# Patient Record
Sex: FEMALE | Race: WHITE | Employment: OTHER | URBAN - METROPOLITAN AREA
[De-identification: names, ages, dates, MRNs, and addresses within clinical notes are randomized per-mention and may not be internally consistent; named-entity substitution may affect disease eponyms.]

---

## 2011-03-08 LAB — COLONOSCOPY, EXTERNAL: NORMAL

## 2018-03-08 ENCOUNTER — OFFICE VISIT (OUTPATIENT)
Dept: FAMILY MEDICINE CLINIC | Age: 63
End: 2018-03-08

## 2018-03-08 VITALS
SYSTOLIC BLOOD PRESSURE: 127 MMHG | BODY MASS INDEX: 30.92 KG/M2 | HEIGHT: 66 IN | DIASTOLIC BLOOD PRESSURE: 77 MMHG | RESPIRATION RATE: 18 BRPM | TEMPERATURE: 97.8 F | HEART RATE: 51 BPM | WEIGHT: 192.4 LBS

## 2018-03-08 DIAGNOSIS — E03.9 ACQUIRED HYPOTHYROIDISM: Primary | ICD-10-CM

## 2018-03-08 DIAGNOSIS — I10 ESSENTIAL HYPERTENSION: ICD-10-CM

## 2018-03-08 DIAGNOSIS — K21.9 GASTROESOPHAGEAL REFLUX DISEASE, ESOPHAGITIS PRESENCE NOT SPECIFIED: ICD-10-CM

## 2018-03-08 DIAGNOSIS — E78.00 HYPERCHOLESTEREMIA: ICD-10-CM

## 2018-03-08 DIAGNOSIS — Z12.11 ENCOUNTER FOR FIT (FECAL IMMUNOCHEMICAL TEST) SCREENING: ICD-10-CM

## 2018-03-08 PROBLEM — J30.89 ALLERGIC RHINITIS DUE TO DUST MITE: Status: ACTIVE | Noted: 2018-03-08

## 2018-03-08 RX ORDER — ALBUTEROL SULFATE 90 UG/1
AEROSOL, METERED RESPIRATORY (INHALATION)
COMMUNITY
End: 2020-01-31 | Stop reason: SDUPTHER

## 2018-03-08 RX ORDER — HYDROCHLOROTHIAZIDE 12.5 MG/1
CAPSULE ORAL
Refills: 0 | COMMUNITY
Start: 2018-02-02 | End: 2018-05-17 | Stop reason: SDUPTHER

## 2018-03-08 RX ORDER — LEVOTHYROXINE SODIUM 88 UG/1
TABLET ORAL
Refills: 5 | COMMUNITY
Start: 2018-02-17 | End: 2018-03-21 | Stop reason: SDUPTHER

## 2018-03-08 RX ORDER — DICLOFENAC SODIUM 10 MG/G
GEL TOPICAL 4 TIMES DAILY
COMMUNITY
End: 2019-02-19

## 2018-03-08 RX ORDER — PRAVASTATIN SODIUM 20 MG/1
TABLET ORAL
Refills: 5 | COMMUNITY
Start: 2018-01-14 | End: 2018-03-08 | Stop reason: SDUPTHER

## 2018-03-08 RX ORDER — PRAVASTATIN SODIUM 20 MG/1
TABLET ORAL
Qty: 90 TAB | Refills: 2 | Status: SHIPPED | OUTPATIENT
Start: 2018-03-08 | End: 2018-11-11 | Stop reason: SDUPTHER

## 2018-03-08 RX ORDER — RAMIPRIL 2.5 MG/1
CAPSULE ORAL
Refills: 0 | COMMUNITY
Start: 2018-02-12 | End: 2018-05-17 | Stop reason: SDUPTHER

## 2018-03-08 NOTE — MR AVS SNAPSHOT
[address] 
[phone] Patient: Edenilson Levine MRN: [id number] :1955 Visit Information Date & Time Provider Department Dept. Phone Encounter #  
 3/8/2018 10:00 AM Dru Breen 34 [id number] Upcoming Health Maintenance Date Due Hepatitis C Screening 1955 PAP AKA CERVICAL CYTOLOGY 1976 FOBT Q 1 YEAR AGE 50-75 2005 BREAST CANCER SCRN MAMMOGRAM 5/15/2019 DTaP/Tdap/Td series (2 - Td) 5/15/2023 Allergies as of 3/8/2018  Review Complete On: 3/8/2018 By: Agatha Cunningham MD  
  
 Severity Noted Reaction Type Reactions Sulfa (Sulfonamide Antibiotics)  2018    Seizures Current Immunizations  Never Reviewed No immunizations on file. Not reviewed this visit You Were Diagnosed With   
  
 Codes Comments Acquired hypothyroidism    -  Primary ICD-10-CM: E03.9 ICD-9-CM: 244.9 Encounter for FIT (fecal immunochemical test) screening     ICD-10-CM: Z12.11 ICD-9-CM: V76.51 Gastroesophageal reflux disease, esophagitis presence not specified     ICD-10-CM: K21.9 ICD-9-CM: 530.81 Essential hypertension     ICD-10-CM: I10 
ICD-9-CM: 401.9 Hypercholesteremia     ICD-10-CM: E78.00 ICD-9-CM: 272.0 Vitals BP Pulse Temp Resp Height(growth percentile) Weight(growth percentile) 127/77 (!) 51 97.8 °F (36.6 °C) (Oral) 18 5' 6\" (1.676 m) 192 lb 6.4 oz (87.3 kg) BMI OB Status Smoking Status 31.05 kg/m2 Postmenopausal Former Smoker Vitals History BMI and BSA Data Body Mass Index Body Surface Area 31.05 kg/m 2 2.02 m 2 Preferred Pharmacy Pharmacy Name Phone [id number] [address] [facility] [phone] Your Updated Medication List  
  
   
 This list is accurate as of 3/8/18 11:12 AM.  Always use your most recent med list.  
  
  
  
  
 diclofenac 1 % Gel Commonly known as:  VOLTAREN Apply  to affected area four (4) times daily. hydroCHLOROthiazide 12.5 mg capsule Commonly known as:  Abilio Jewel TAKE ONE CAPSULE BY MOUTH EVERY DAY  
  
 levothyroxine 88 mcg tablet Commonly known as:  SYNTHROID  
1 TABLET ON AN EMPTY STOMACH IN THE MORNING EVERY OTHER DAY ALTERNATING WITH 100 MCG ORALLY 30 DAYS  
  
 pravastatin 20 mg tablet Commonly known as:  PRAVACHOL  
TAKE 1 TABLET ONCE A DAY ORALLY PROAIR HFA 90 mcg/actuation inhaler Generic drug:  albuterol Take  by inhalation. ramipril 2.5 mg capsule Commonly known as:  ALTACE  
TAKE 1 CAPSULE BY MOUTH ONCE A DAY FOR 90 DAYS Prescriptions Sent to Pharmacy Refills  
 pravastatin (PRAVACHOL) 20 mg tablet 2 Sig: TAKE 1 TABLET ONCE A DAY ORALLY Class: Normal  
 Pharmacy: [address] [place] [address] #: [phone] We Performed the Following OCCULT BLOOD, IMMUNOASSAY (FIT) [id number] CPT(R)] TSH 3RD GENERATION [01944 CPT(R)] Introducing [facility]! [employer] introduces [employer] patient portal. Now you can access parts of your medical record, email your doctor's office, and request medication refills online. 1. In your internet browser, go to [email or web address] 2. Click on the First Time User? Click Here link in the Sign In box. You will see the New Member Sign Up page. 3. Enter your [employer] Access Code exactly as it appears below. You will not need to use this code after youve completed the sign-up process. If you do not sign up before the expiration date, you must request a new code. · [employer] Access Code: [email or web address] Expires: 6/6/2018 11:12 AM 
 
4.  Enter the last four digits of your Social Security Number (xxxx) and Date of Birth (mm/dd/yyyy) as indicated and click Submit. You will be taken to the next sign-up page. 5. Create a SchoolTube ID. This will be your SchoolTube login ID and cannot be changed, so think of one that is secure and easy to remember. 6. Create a SchoolTube password. You can change your password at any time. 7. Enter your Password Reset Question and Answer. This can be used at a later time if you forget your password. 8. Enter your e-mail address. You will receive e-mail notification when new information is available in [address] 9. Click Sign Up. You can now view and download portions of your medical record. 10. Click the Download Summary menu link to download a portable copy of your medical information. If you have questions, please visit the Frequently Asked Questions section of the SchoolTube website. Remember, SchoolTube is NOT to be used for urgent needs. For medical emergencies, dial 911. Now available from your iPhone and Android! Please provide this summary of care documentation to your next provider. Your primary care clinician is listed as Isadora Disla. If you have any questions after today's visit, please call [phone].

## 2018-03-08 NOTE — PROGRESS NOTES
Chief Complaint   Patient presents with   [facility] Establish Care     1. Have you been to the ER, urgent care clinic since your last visit? No Hospitalized since your last visit? No     2. Have you seen or consulted any other health care providers outside of the [address] [place] [address] since your last visit? Include any pap smears or colon screening.  [facility]

## 2018-03-08 NOTE — PROGRESS NOTES
HISTORY OF PRESENT ILLNESS  Conner Curiel is a 58 y.o. female. HPI Comments: Conner Curiel is here to establish care. She brings in her medical records, which were reviewed, and all of her labs are up to date except for her TSH. Her dose was changed in December, and she needs a follow up one. Establish Care   The history is provided by the patient. Pertinent negatives include no chest pain, no abdominal pain, no headaches and no shortness of breath. Hypertension   The history is provided by the patient. This is a chronic problem. The current episode started more than 1 week ago. The problem occurs constantly. The problem has been gradually improving. Pertinent negatives include no chest pain, no abdominal pain, no headaches and no shortness of breath. Nothing aggravates the symptoms. The symptoms are relieved by medications. Treatments tried: ramipril, HCTZ. The treatment provided significant relief. Review of Systems   Constitutional: Positive for weight loss. No weight gain   Eyes: Negative for blurred vision. Respiratory: Negative for shortness of breath. Cardiovascular: Negative for chest pain and leg swelling. Gastrointestinal: Positive for heartburn. Negative for abdominal pain. She has GERD symptoms once a week   Neurological: Negative for dizziness, sensory change, speech change, focal weakness and headaches. Visit Vitals    /77    Pulse (!) 51    Temp 97.8 °F (36.6 °C) (Oral)    Resp 18    Ht 5' 6\" (1.676 m)    Wt 192 lb 6.4 oz (87.3 kg)    BMI 31.05 kg/m2     Physical Exam   Constitutional: She is oriented to person, place, and time. She appears well-developed and well-nourished. No distress. Neck: No thyromegaly present. Cardiovascular: Normal rate, regular rhythm and normal heart sounds. Exam reveals no gallop and no friction rub. No murmur heard. Pulmonary/Chest: Effort normal and breath sounds normal. No respiratory distress.  She has no wheezes. She has no rales. Musculoskeletal: She exhibits no edema. Neurological: She is alert and oriented to person, place, and time. Skin: Skin is warm and dry. She is not diaphoretic. Nursing note and vitals reviewed. ASSESSMENT and PLAN    ICD-10-CM ICD-9-CM    1. Acquired hypothyroidism E03.9 244.9 TSH 3RD GENERATION   2. Essential hypertension I10 401.9    3. Gastroesophageal reflux disease, esophagitis presence not specified K21.9 530.81    4. Hypercholesteremia E78.00 272.0 pravastatin (PRAVACHOL) 20 mg tablet   5. Encounter for FIT (fecal immunochemical test) screening Z12.11 V76.51 OCCULT BLOOD, IMMUNOASSAY (FIT)        Needs follow up TSH  Blood pressure controlled  Controlled gastroesophageal reflux disease  Check TSH  Refill pravastatin  Kit for FOBT testing given to patient    Follow-up Disposition:  Return in about 6 months (around 9/8/2018) for blood pressure. Reviewed plan of care. Patient has provided input and agrees with goals.

## 2018-03-09 LAB — TSH SERPL DL<=0.005 MIU/L-ACNC: 0.27 UIU/ML (ref 0.45–4.5)

## 2018-03-19 ENCOUNTER — TELEPHONE (OUTPATIENT)
Dept: FAMILY MEDICINE CLINIC | Age: 63
End: 2018-03-19

## 2018-03-19 DIAGNOSIS — E03.9 ACQUIRED HYPOTHYROIDISM: Primary | ICD-10-CM

## 2018-03-19 NOTE — TELEPHONE ENCOUNTER
Please call patient and let her know her thyroid is a little overactive. Please confirm her dose. I am decreasing her thyroid medication and will send a new prescription to her pharmacy. She will need a TSH in 6 weeks, and I have printed a lab slip.

## 2018-03-19 NOTE — TELEPHONE ENCOUNTER
Patient informed & she is taking Levothyroxine 88 mcg daily. I did not see new order sent to [facility] pharmacy to decrease the thyroid medication dosage.

## 2018-03-21 RX ORDER — LEVOTHYROXINE SODIUM 75 UG/1
TABLET ORAL
Qty: 30 TAB | Refills: 1 | Status: SHIPPED | OUTPATIENT
Start: 2018-03-21 | End: 2018-05-10 | Stop reason: SDUPTHER

## 2018-03-21 NOTE — TELEPHONE ENCOUNTER
Patient informed, new order for thyroid medication was sent to her pharmacy and lab slip was mailed to her home address.

## 2018-04-02 LAB — HEMOCCULT STL QL IA: NEGATIVE

## 2018-04-30 DIAGNOSIS — E03.9 ACQUIRED HYPOTHYROIDISM: ICD-10-CM

## 2018-05-02 LAB — TSH SERPL DL<=0.005 MIU/L-ACNC: 4.65 UIU/ML (ref 0.45–4.5)

## 2018-05-10 NOTE — TELEPHONE ENCOUNTER
From: Raphael Daniel  To: Julieth Romano MD  Sent: 5/10/2018 10:06 AM EDT  Subject: Medication Renewal Request    Original authorizing provider: Julieth Romano MD    Raphael Daniel would like a refill of the following medications:  levothyroxine (SYNTHROID) 75 mcg tablet Julieth Romano MD]    [id number] [address] [facility] [place]    Comment:  I also need to have my Ramipril refilled. This would be the first time Dr. Velvet Amaya has filled this prescription for me since I switched over to her office. Please check my charts. It should be listed on them as a medication that I am taking. In addition I just had bloodwork done for the levothyroxine and Dr. Velvet Amaya might need to check that before refilling this prescription. Finally, just so that you know I have 2 weeks left on both of these prescriptions and then I am all out of medication.

## 2018-05-13 RX ORDER — LEVOTHYROXINE SODIUM 75 UG/1
TABLET ORAL
Qty: 30 TAB | Refills: 1 | Status: SHIPPED | OUTPATIENT
Start: 2018-05-13 | End: 2018-05-17 | Stop reason: DRUGHIGH

## 2018-05-17 ENCOUNTER — DOCUMENTATION ONLY (OUTPATIENT)
Dept: FAMILY MEDICINE CLINIC | Age: 63
End: 2018-05-17

## 2018-05-17 ENCOUNTER — OFFICE VISIT (OUTPATIENT)
Dept: FAMILY MEDICINE CLINIC | Age: 63
End: 2018-05-17

## 2018-05-17 VITALS
BODY MASS INDEX: 31.02 KG/M2 | HEART RATE: 60 BPM | SYSTOLIC BLOOD PRESSURE: 119 MMHG | DIASTOLIC BLOOD PRESSURE: 75 MMHG | TEMPERATURE: 98.8 F | HEIGHT: 66 IN | WEIGHT: 193 LBS | RESPIRATION RATE: 18 BRPM

## 2018-05-17 DIAGNOSIS — I10 ESSENTIAL HYPERTENSION: ICD-10-CM

## 2018-05-17 DIAGNOSIS — E03.9 ACQUIRED HYPOTHYROIDISM: Primary | ICD-10-CM

## 2018-05-17 RX ORDER — RAMIPRIL 2.5 MG/1
CAPSULE ORAL
Qty: 90 CAP | Refills: 3 | Status: SHIPPED | OUTPATIENT
Start: 2018-05-17 | End: 2018-05-20 | Stop reason: SDUPTHER

## 2018-05-17 RX ORDER — HYDROCHLOROTHIAZIDE 12.5 MG/1
CAPSULE ORAL
Qty: 90 CAP | Refills: 3 | Status: SHIPPED | OUTPATIENT
Start: 2018-05-17 | End: 2018-05-20 | Stop reason: SDUPTHER

## 2018-05-17 RX ORDER — HYDROCHLOROTHIAZIDE 12.5 MG/1
CAPSULE ORAL
Qty: 90 CAP | Refills: 0 | Status: CANCELLED | OUTPATIENT
Start: 2018-05-17

## 2018-05-17 RX ORDER — RAMIPRIL 2.5 MG/1
CAPSULE ORAL
Qty: 90 CAP | Refills: 0 | Status: CANCELLED | OUTPATIENT
Start: 2018-05-17

## 2018-05-17 RX ORDER — LEVOTHYROXINE SODIUM 75 UG/1
TABLET ORAL
Qty: 15 TAB | Refills: 1 | Status: SHIPPED | OUTPATIENT
Start: 2018-05-17 | End: 2019-07-13 | Stop reason: SDUPTHER

## 2018-05-17 RX ORDER — LEVOTHYROXINE SODIUM 88 UG/1
TABLET ORAL
Qty: 15 TAB | Refills: 1 | Status: SHIPPED | OUTPATIENT
Start: 2018-05-17 | End: 2018-09-08 | Stop reason: SDUPTHER

## 2018-05-17 NOTE — PROGRESS NOTES
Chief Complaint   Patient presents with    Medication Refill     f/u abnormal labs-thyroid     1. Have you been to the ER, urgent care clinic since your last visit? No  Hospitalized since your last visit? No     2. Have you seen or consulted any other health care providers outside of the [address] [place] [address] since your last visit? Include any pap smears or colon screening.  No

## 2018-05-17 NOTE — MR AVS SNAPSHOT
[address] 
[phone] Patient: Warden Gabriel MRN: [id number] :1955 Visit Information Date & Time Provider Department Dept. Phone Encounter #  
 2018  2:30 PM Dru Page 34 [id number] Follow-up Instructions Return in about 3 months (around 2018) for blood pressure. Your Appointments 2018  9:45 AM  
ROUTINE CARE with Henrry Salas MD  
[address] [facility]) Appt Note: 6 month follow up HTN, thyroid [address] [facility] [address]  
[phone]  
  
   
 [address] [place] [address] Upcoming Health Maintenance Date Due Hepatitis C Screening 1955 PAP AKA CERVICAL CYTOLOGY 1976 Influenza Age 5 to Adult 2018 BREAST CANCER SCRN MAMMOGRAM 5/15/2019 COLONOSCOPY 3/8/2021 DTaP/Tdap/Td series (2 - Td) 5/15/2023 Allergies as of 2018  Review Complete On: 2018 By: Henrry Salas MD  
  
 Severity Noted Reaction Type Reactions Sulfa (Sulfonamide Antibiotics)  2018    Seizures Current Immunizations  Never Reviewed No immunizations on file. Not reviewed this visit You Were Diagnosed With   
  
 Codes Comments Acquired hypothyroidism    -  Primary ICD-10-CM: E03.9 ICD-9-CM: 244.9 Essential hypertension     ICD-10-CM: I10 
ICD-9-CM: 401.9 Vitals BP Pulse Temp Resp Height(growth percentile) Weight(growth percentile) 119/75 60 98.8 °F (37.1 °C) (Oral) 18 5' 6\" (1.676 m) 193 lb (87.5 kg) BMI OB Status Smoking Status 31.15 kg/m2 Postmenopausal Former Smoker Vitals History BMI and BSA Data Body Mass Index Body Surface Area  
 31.15 kg/m 2 2.02 m 2 Preferred Pharmacy Pharmacy Name Phone [id number]- [address] AT [facility] [phone] Your Updated Medication List  
  
   
This list is accurate as of 18  4:00 PM.  Always use your most recent med list.  
  
  
  
  
 diclofenac 1 % Gel Commonly known as:  VOLTAREN Apply  to affected area four (4) times daily. hydroCHLOROthiazide 12.5 mg capsule Commonly known as:  Jaramillo Dyers TAKE ONE CAPSULE BY MOUTH EVERY DAY  
  
 * levothyroxine 88 mcg tablet Commonly known as:  SYNTHROID  
1 TABLET PO, ON AN EMPTY STOMACH, IN THE MORNING EVERY OTHER DAY ALTERNATING WITH 75 MCG  
  
 * levothyroxine 75 mcg tablet Commonly known as:  SYNTHROID  
1 TABLET PO, ON AN EMPTY STOMACH, IN THE MORNING EVERY OTHER DAY ALTERNATING WITH 88 MCG  
  
 pravastatin 20 mg tablet Commonly known as:  PRAVACHOL  
TAKE 1 TABLET ONCE A DAY ORALLY PROAIR HFA 90 mcg/actuation inhaler Generic drug:  albuterol Take  by inhalation. ramipril 2.5 mg capsule Commonly known as:  ALTACE  
TAKE 1 CAPSULE BY MOUTH ONCE A DAY FOR 90 DAYS * Notice: This list has 2 medication(s) that are the same as other medications prescribed for you. Read the directions carefully, and ask your doctor or other care provider to review them with you. Prescriptions Sent to Pharmacy Refills  
 ramipril (ALTACE) 2.5 mg capsule 3 Sig: TAKE 1 CAPSULE BY MOUTH ONCE A DAY FOR 90 DAYS Class: Normal  
 Pharmacy: [address] [place] [address] Ph #: [phone]  
 hydroCHLOROthiazide (MICROZIDE) 12.5 mg capsule 3 Sig: TAKE ONE CAPSULE BY MOUTH EVERY DAY Class: Normal  
 Pharmacy: [address] [place] [address] Ph #: [phone]  
 levothyroxine (SYNTHROID) 88 mcg tablet 1 Si TABLET PO, ON AN EMPTY STOMACH, IN THE MORNING EVERY OTHER DAY ALTERNATING WITH 75 MCG  Class: Normal  
 Pharmacy: [id number], [address] [place] [address] Ph #: [phone]  
 levothyroxine (SYNTHROID) 75 mcg tablet 1 Si TABLET PO, ON AN EMPTY STOMACH, IN THE MORNING EVERY OTHER DAY ALTERNATING WITH 88 MCG Class: Normal  
 Pharmacy: [facility], [address] [facility] Ph #: [phone] Follow-up Instructions Return in about 3 months (around 2018) for blood pressure. To-Do List   
 Around 2018 Lab:  METABOLIC PANEL, BASIC Around 2018 Lab:  TSH 3RD GENERATION Introducing [facility]! Dear Chepe Whitley: 
Thank you for requesting a [employer] account. Our records indicate that you already have an active [employer] account. You can access your account anytime at [email or web address] Did you know that you can access your hospital and ER discharge instructions at any time in [employer]? You can also review all of your test results from your hospital stay or ER visit. Additional Information If you have questions, please visit the Frequently Asked Questions section of the [employer] website at [email or web address] Remember, [employer] is NOT to be used for urgent needs. For medical emergencies, dial 911. Now available from your iPhone and Android! Please provide this summary of care documentation to your next provider. Your primary care clinician is listed as Felisa Munroe. If you have any questions after today's visit, please call [phone].

## 2018-05-17 NOTE — PROGRESS NOTES
Pt called to advise she received Dr. Gustavo Ken email regarding abnormal thyroid labs and scheduled follow up appointment today. Pt also requesting status of refills and will discuss at her appointment.   Alonzo

## 2018-05-17 NOTE — TELEPHONE ENCOUNTER
Pt has appointment today and needs refills on medications sent to [facility] pharmacy.  Alonzo

## 2018-05-17 NOTE — PROGRESS NOTES
HISTORY OF PRESENT ILLNESS  Raphael Daniel is a 58 y.o. female. HPI Comments: Raphael Daniel is here to follow up on her thyroid labs. Her TSH was mildly depressed, so her Synthroid was decreased to the next lowest strength. When her TSH was checked this month, however, her TSH was mildly elevated. She says she has been going through this for several years. The patient takes her medication daily and wakes up in the middle of the night to take her medication so it doesn't interact with anything. Also, she needs her blood pressure medications refilled. Review of Systems   Constitutional: Negative for malaise/fatigue and weight loss. No weight gain   HENT:        No voice changes   Gastrointestinal: Negative for constipation and diarrhea. Skin:        Her hair loss has gotten better. No skin changes. Neurological: Negative for tremors and weakness. Psychiatric/Behavioral: Negative for depression. Visit Vitals    /75    Pulse 60    Temp 98.8 °F (37.1 °C) (Oral)    Resp 18    Ht 5' 6\" (1.676 m)    Wt 193 lb (87.5 kg)    BMI 31.15 kg/m2     Physical Exam   Constitutional: She is oriented to person, place, and time. She appears well-developed and well-nourished. No distress. Cardiovascular: Normal rate, regular rhythm and normal heart sounds. Exam reveals no gallop and no friction rub. No murmur heard. Pulmonary/Chest: Effort normal and breath sounds normal. No respiratory distress. She has no wheezes. She has no rales. Musculoskeletal: She exhibits no edema. Neurological: She is alert and oriented to person, place, and time. She has normal reflexes. Skin: Skin is warm and dry. She is not diaphoretic. Good turgor, no hair loss   Psychiatric: She has a normal mood and affect. Nursing note and vitals reviewed. ASSESSMENT and PLAN    ICD-10-CM ICD-9-CM    1.  Acquired hypothyroidism E03.9 244.9 levothyroxine (SYNTHROID) 88 mcg tablet      levothyroxine (SYNTHROID) 75 mcg tablet      METABOLIC PANEL, BASIC      TSH 3RD GENERATION   2. Essential hypertension I10 401.9 ramipril (ALTACE) 2.5 mg capsule      hydroCHLOROthiazide (MICROZIDE) 12.5 mg capsule        Difficult to control hypothyroidism  Blood pressure control  Increase Synthroid, alternating doses  Labs per orders in 6 weeks  Refills per orders    Follow-up Disposition:  Return in about 3 months (around 8/17/2018) for blood pressure. Reviewed plan of care. Patient has provided input and agrees with goals.

## 2018-05-20 DIAGNOSIS — I10 ESSENTIAL HYPERTENSION: ICD-10-CM

## 2018-05-20 RX ORDER — HYDROCHLOROTHIAZIDE 12.5 MG/1
CAPSULE ORAL
Qty: 90 CAP | Refills: 3 | Status: SHIPPED | OUTPATIENT
Start: 2018-05-20 | End: 2019-02-26 | Stop reason: SDUPTHER

## 2018-05-20 RX ORDER — RAMIPRIL 2.5 MG/1
CAPSULE ORAL
Qty: 90 CAP | Refills: 3 | Status: SHIPPED | OUTPATIENT
Start: 2018-05-20 | End: 2019-02-26 | Stop reason: SDUPTHER

## 2018-06-30 LAB
BUN SERPL-MCNC: 12 MG/DL (ref 8–27)
BUN/CREAT SERPL: 12 (ref 12–28)
CALCIUM SERPL-MCNC: 9.6 MG/DL (ref 8.7–10.3)
CHLORIDE SERPL-SCNC: 100 MMOL/L (ref 96–106)
CO2 SERPL-SCNC: 28 MMOL/L (ref 20–29)
CREAT SERPL-MCNC: 1.04 MG/DL (ref 0.57–1)
GLUCOSE SERPL-MCNC: 114 MG/DL (ref 65–99)
INTERPRETATION: NORMAL
POTASSIUM SERPL-SCNC: 3.9 MMOL/L (ref 3.5–5.2)
SODIUM SERPL-SCNC: 141 MMOL/L (ref 134–144)
TSH SERPL DL<=0.005 MIU/L-ACNC: 3 UIU/ML (ref 0.45–4.5)

## 2018-07-02 DIAGNOSIS — E03.9 ACQUIRED HYPOTHYROIDISM: ICD-10-CM

## 2018-07-10 LAB — MAMMOGRAPHY, EXTERNAL: NORMAL

## 2018-09-03 RX ORDER — LEVOTHYROXINE SODIUM 75 UG/1
TABLET ORAL
Qty: 30 TAB | Refills: 9 | Status: SHIPPED | OUTPATIENT
Start: 2018-09-03 | End: 2019-11-07 | Stop reason: SDUPTHER

## 2018-09-07 ENCOUNTER — OFFICE VISIT (OUTPATIENT)
Dept: FAMILY MEDICINE CLINIC | Age: 63
End: 2018-09-07

## 2018-09-07 VITALS
RESPIRATION RATE: 18 BRPM | BODY MASS INDEX: 31.34 KG/M2 | TEMPERATURE: 97.5 F | HEIGHT: 66 IN | WEIGHT: 195 LBS | SYSTOLIC BLOOD PRESSURE: 138 MMHG | DIASTOLIC BLOOD PRESSURE: 83 MMHG | HEART RATE: 61 BPM

## 2018-09-07 DIAGNOSIS — I10 ESSENTIAL HYPERTENSION: Primary | ICD-10-CM

## 2018-09-07 DIAGNOSIS — Z11.59 NEED FOR HEPATITIS C SCREENING TEST: ICD-10-CM

## 2018-09-07 DIAGNOSIS — R94.4 DECREASED GFR: ICD-10-CM

## 2018-09-07 NOTE — PROGRESS NOTES
HISTORY OF PRESENT ILLNESS  Melanie Mcdaniel is a 58 y.o. female. HPI Comments: Melanie Mcdaniel is here to follow up on her HTN. Also, she is concerned that her GFR was slightly low on her last labs. She is avoiding NSAIDs. Also, she wants hep C testing. Hypertension   The history is provided by the patient. This is a chronic problem. The current episode started more than 1 week ago. The problem occurs constantly. The problem has been gradually worsening. Pertinent negatives include no chest pain, no abdominal pain, no headaches and no shortness of breath. Nothing aggravates the symptoms. The symptoms are relieved by medications. Treatments tried: Altace, HCTZ. The treatment provided significant relief. Review of Systems   Constitutional: Negative for weight loss. No weight gain   Eyes: Negative for blurred vision. Respiratory: Negative for shortness of breath. Cardiovascular: Negative for chest pain and leg swelling. Gastrointestinal: Negative for abdominal pain. Neurological: Negative for dizziness, sensory change, speech change, focal weakness and headaches. Visit Vitals    /83    Pulse 61    Temp 97.5 °F (36.4 °C) (Oral)    Resp 18    Ht 5' 6\" (1.676 m)    Wt 195 lb (88.5 kg)    BMI 31.47 kg/m2     BP Readings from Last 3 Encounters:   09/07/18 138/83   05/17/18 119/75   03/08/18 127/77     Physical Exam   Constitutional: She is oriented to person, place, and time. She appears well-developed and well-nourished. No distress. Cardiovascular: Normal rate, regular rhythm and normal heart sounds. Exam reveals no gallop and no friction rub. No murmur heard. Pulmonary/Chest: Effort normal and breath sounds normal. No respiratory distress. She has no wheezes. She has no rales. Musculoskeletal: She exhibits no edema. Neurological: She is alert and oriented to person, place, and time. Skin: Skin is warm and dry. She is not diaphoretic.    Nursing note and vitals reviewed. ASSESSMENT and PLAN    ICD-10-CM ICD-9-CM    1. Essential hypertension U45 186.7 METABOLIC PANEL, BASIC   2. Decreased GFR C09.5 807.7 METABOLIC PANEL, BASIC   3. Need for hepatitis C screening test Z11.59 V73.89 HCV AB W/RFLX TO [place]        Blood pressure controlled  Needs follow up on her GFR, off NSAIDs  Labs per orders. Continue current plans. Follow-up Disposition:  Return in about 6 months (around 3/7/2019) for blood pressure. Reviewed plan of care. Patient has provided input and agrees with goals.

## 2018-09-07 NOTE — MR AVS SNAPSHOT
[address] 
[phone] Patient: Concepción Puri MRN: [id number] :1955 Visit Information Date & Time Provider Department Dept. Phone Encounter #  
 2018  9:45 AM Dru Crow 34 [id number] Follow-up Instructions Return in about 6 months (around 3/7/2019) for blood pressure. Upcoming Health Maintenance Date Due Hepatitis C Screening 1955 Influenza Age 5 to Adult 2018 BREAST CANCER SCRN MAMMOGRAM 7/10/2020 COLONOSCOPY 3/8/2021 PAP AKA CERVICAL CYTOLOGY 2021 DTaP/Tdap/Td series (2 - Td) 5/15/2023 Allergies as of 2018  Review Complete On: 2018 By: Shanti Miner MD  
  
 Severity Noted Reaction Type Reactions Sulfa (Sulfonamide Antibiotics)  2018    Seizures Current Immunizations  Never Reviewed No immunizations on file. Not reviewed this visit You Were Diagnosed With   
  
 Codes Comments Essential hypertension    -  Primary ICD-10-CM: I10 
ICD-9-CM: 401.9 Decreased GFR     ICD-10-CM: R94.4 ICD-9-CM: 794.4 Need for hepatitis C screening test     ICD-10-CM: Z11.59 
ICD-9-CM: V73.89 Vitals BP Pulse Temp Resp Height(growth percentile) Weight(growth percentile) 138/83 61 97.5 °F (36.4 °C) (Oral) 18 5' 6\" (1.676 m) 195 lb (88.5 kg) BMI OB Status Smoking Status 31.47 kg/m2 Postmenopausal Former Smoker Vitals History BMI and BSA Data Body Mass Index Body Surface Area  
 31.47 kg/m 2 2.03 m 2 Preferred Pharmacy Pharmacy Name Phone [id number] [place], [address] [facility] [phone] Your Updated Medication List  
  
   
This list is accurate as of 18 10:42 AM.  Always use your most recent med list.  
  
  
  
  
 diclofenac 1 % Gel Commonly known as:  VOLTAREN  
 Apply  to affected area four (4) times daily. hydroCHLOROthiazide 12.5 mg capsule Commonly known as:  Santiago Sine TAKE ONE CAPSULE BY MOUTH EVERY DAY  
  
 * levothyroxine 88 mcg tablet Commonly known as:  SYNTHROID  
1 TABLET PO, ON AN EMPTY STOMACH, IN THE MORNING EVERY OTHER DAY ALTERNATING WITH 75 MCG  
  
 * levothyroxine 75 mcg tablet Commonly known as:  SYNTHROID  
1 TABLET PO, ON AN EMPTY STOMACH, IN THE MORNING EVERY OTHER DAY ALTERNATING WITH 88 MCG  
  
 * levothyroxine 75 mcg tablet Commonly known as:  SYNTHROID  
TAKE 1 TABLET BY MOUTH EVERY DAY  
  
 pravastatin 20 mg tablet Commonly known as:  PRAVACHOL  
TAKE 1 TABLET ONCE A DAY ORALLY PROAIR HFA 90 mcg/actuation inhaler Generic drug:  albuterol Take  by inhalation. ramipril 2.5 mg capsule Commonly known as:  ALTACE  
TAKE 1 CAPSULE BY MOUTH ONCE A DAY FOR 90 DAYS * Notice: This list has 3 medication(s) that are the same as other medications prescribed for you. Read the directions carefully, and ask your doctor or other care provider to review them with you. We Performed the Following HCV AB W/RFLX TO FLORENTIN [96196 CPT(R)] METABOLIC PANEL, BASIC [26046 CPT(R)] Follow-up Instructions Return in about 6 months (around 3/7/2019) for blood pressure. Introducing hospitals & HEALTH SERVICES! Dear Mary Memos: 
Thank you for requesting a [employer] account. Our records indicate that you already have an active [employer] account. You can access your account anytime at [email or web address] Did you know that you can access your hospital and ER discharge instructions at any time in [employer]? You can also review all of your test results from your hospital stay or ER visit. Additional Information If you have questions, please visit the Frequently Asked Questions section of the [employer] website at [email or web address] Remember, MyChart is NOT to be used for urgent needs. For medical emergencies, dial 911. Now available from your iPhone and Android! Please provide this summary of care documentation to your next provider. Your primary care clinician is listed as Basil Phoenix. If you have any questions after today's visit, please call [phone].

## 2018-09-07 NOTE — PROGRESS NOTES
Chief Complaint   Patient presents with    Hypertension     and thyroid 6 mo f/u; discuss labs     1. Have you been to the ER, urgent care clinic since your last visit? No Hospitalized since your last visit? No    2. Have you seen or consulted any other health care providers outside of the [address] [place] [address] since your last visit? Include any pap smears or colon screening.  No     Pt wants to discuss if need for Hep C test.

## 2018-09-08 DIAGNOSIS — E03.9 ACQUIRED HYPOTHYROIDISM: ICD-10-CM

## 2018-09-08 LAB
BUN SERPL-MCNC: 11 MG/DL (ref 8–27)
BUN/CREAT SERPL: 14 (ref 12–28)
CALCIUM SERPL-MCNC: 10 MG/DL (ref 8.7–10.3)
CHLORIDE SERPL-SCNC: 102 MMOL/L (ref 96–106)
CO2 SERPL-SCNC: 28 MMOL/L (ref 20–29)
CREAT SERPL-MCNC: 0.77 MG/DL (ref 0.57–1)
GLUCOSE SERPL-MCNC: 96 MG/DL (ref 65–99)
HCV AB S/CO SERPL IA: <0.1 S/CO RATIO (ref 0–0.9)
HCV AB SERPL QL IA: NORMAL
POTASSIUM SERPL-SCNC: 4.7 MMOL/L (ref 3.5–5.2)
SODIUM SERPL-SCNC: 145 MMOL/L (ref 134–144)

## 2018-09-08 RX ORDER — LEVOTHYROXINE SODIUM 88 UG/1
TABLET ORAL
Qty: 15 TAB | Refills: 9 | Status: SHIPPED | OUTPATIENT
Start: 2018-09-08 | End: 2019-07-07 | Stop reason: SDUPTHER

## 2018-11-11 DIAGNOSIS — E78.00 HYPERCHOLESTEREMIA: ICD-10-CM

## 2018-11-11 DIAGNOSIS — I10 ESSENTIAL HYPERTENSION: ICD-10-CM

## 2018-11-11 DIAGNOSIS — E03.9 ACQUIRED HYPOTHYROIDISM: Primary | ICD-10-CM

## 2018-11-11 RX ORDER — PRAVASTATIN SODIUM 20 MG/1
TABLET ORAL
Qty: 90 TAB | Refills: 0 | Status: SHIPPED | OUTPATIENT
Start: 2018-11-11 | End: 2019-02-21 | Stop reason: SDUPTHER

## 2018-11-12 NOTE — TELEPHONE ENCOUNTER
Called and spoke with pt, and she has been advised and states understanding of needing to have labs drawn. Lab slips have been mailed.

## 2018-11-30 LAB
ALBUMIN SERPL-MCNC: 4.6 G/DL (ref 3.6–4.8)
ALP SERPL-CCNC: 66 IU/L (ref 39–117)
ALT SERPL-CCNC: 20 IU/L (ref 0–32)
AST SERPL-CCNC: 20 IU/L (ref 0–40)
BILIRUB DIRECT SERPL-MCNC: 0.19 MG/DL (ref 0–0.4)
BILIRUB SERPL-MCNC: 0.8 MG/DL (ref 0–1.2)
BUN SERPL-MCNC: 11 MG/DL (ref 8–27)
BUN/CREAT SERPL: 12 (ref 12–28)
CALCIUM SERPL-MCNC: 9.5 MG/DL (ref 8.7–10.3)
CHLORIDE SERPL-SCNC: 101 MMOL/L (ref 96–106)
CHOLEST SERPL-MCNC: 170 MG/DL (ref 100–199)
CO2 SERPL-SCNC: 28 MMOL/L (ref 20–29)
CREAT SERPL-MCNC: 0.92 MG/DL (ref 0.57–1)
GLUCOSE SERPL-MCNC: 97 MG/DL (ref 65–99)
HDLC SERPL-MCNC: 63 MG/DL
INTERPRETATION, 910389: NORMAL
LDLC SERPL CALC-MCNC: 89 MG/DL (ref 0–99)
POTASSIUM SERPL-SCNC: 3.6 MMOL/L (ref 3.5–5.2)
PROT SERPL-MCNC: 6.9 G/DL (ref 6–8.5)
SODIUM SERPL-SCNC: 144 MMOL/L (ref 134–144)
TRIGL SERPL-MCNC: 91 MG/DL (ref 0–149)
TSH SERPL DL<=0.005 MIU/L-ACNC: 4.28 UIU/ML (ref 0.45–4.5)
VLDLC SERPL CALC-MCNC: 18 MG/DL (ref 5–40)

## 2019-02-19 ENCOUNTER — OFFICE VISIT (OUTPATIENT)
Dept: FAMILY MEDICINE CLINIC | Age: 64
End: 2019-02-19

## 2019-02-19 VITALS
WEIGHT: 206 LBS | HEIGHT: 66 IN | DIASTOLIC BLOOD PRESSURE: 83 MMHG | TEMPERATURE: 98 F | HEART RATE: 56 BPM | BODY MASS INDEX: 33.11 KG/M2 | SYSTOLIC BLOOD PRESSURE: 134 MMHG | RESPIRATION RATE: 18 BRPM

## 2019-02-19 DIAGNOSIS — R31.9 HEMATURIA, UNSPECIFIED TYPE: ICD-10-CM

## 2019-02-19 DIAGNOSIS — J01.00 ACUTE NON-RECURRENT MAXILLARY SINUSITIS: ICD-10-CM

## 2019-02-19 DIAGNOSIS — I10 ESSENTIAL HYPERTENSION: Primary | ICD-10-CM

## 2019-02-19 LAB
BILIRUB UR QL STRIP: NEGATIVE
GLUCOSE UR-MCNC: NEGATIVE MG/DL
KETONES P FAST UR STRIP-MCNC: NEGATIVE MG/DL
PH UR STRIP: 6.5 [PH] (ref 4.6–8)
PROT UR QL STRIP: NEGATIVE
SP GR UR STRIP: 1.01 (ref 1–1.03)
UA UROBILINOGEN AMB POC: NORMAL (ref 0.2–1)
URINALYSIS CLARITY POC: CLEAR
URINALYSIS COLOR POC: YELLOW
URINE BLOOD POC: NORMAL
URINE LEUKOCYTES POC: NEGATIVE
URINE NITRITES POC: NEGATIVE

## 2019-02-19 RX ORDER — AMOXICILLIN AND CLAVULANATE POTASSIUM 875; 125 MG/1; MG/1
1 TABLET, FILM COATED ORAL 2 TIMES DAILY
Qty: 20 TAB | Refills: 0 | Status: SHIPPED | OUTPATIENT
Start: 2019-02-19 | End: 2019-03-01

## 2019-02-19 NOTE — PROGRESS NOTES
Chief Complaint   Patient presents with    Hypertension     4 mo f/u    Headache     sinus pain      1. Have you been to the ER, urgent care clinic since your last visit? Hospitalized since your last visit? Yes Urgent Care 12/2018 dx sinus infection     2. Have you seen or consulted any other health care providers outside of the [facility] since your last visit? Include any pap smears or colon screening.  No

## 2019-02-19 NOTE — PROGRESS NOTES
HISTORY OF PRESENT ILLNESS  Sarai Lowery is a 61 y.o. female. Sarai Lowery is here for follow up on her HTN. Also, she has had a sinus infection for the past 2-3 weeks. Symptoms include clear to green nasal discharge, nose bleeding, sinus and tooth pain. Also, she request that she have her urine checked \"just in case\". Hypertension   The history is provided by the patient. This is a chronic problem. The current episode started more than 1 week ago. The problem occurs constantly. The problem has been gradually improving. Associated symptoms include headaches. Pertinent negatives include no chest pain, no abdominal pain and no shortness of breath. Associated symptoms comments: She is having headaches from her current sinus infection. Nothing aggravates the symptoms. The symptoms are relieved by medications. Treatments tried: Altace, HCTZ. The treatment provided significant relief. Review of Systems   Constitutional: Negative for fever and weight loss. Weight gain   Eyes: Negative for blurred vision. Respiratory: Negative for cough, sputum production, shortness of breath and wheezing. Cardiovascular: Negative for chest pain and leg swelling. Gastrointestinal: Negative for abdominal pain. Neurological: Positive for headaches. Negative for dizziness, sensory change, speech change and focal weakness. Visit Vitals  /83   Pulse (!) 56   Temp 98 °F (36.7 °C) (Oral)   Resp 18   Ht 5' 6\" (1.676 m)   Wt 206 lb (93.4 kg)   BMI 33.25 kg/m²     BP Readings from Last 3 Encounters:   09/07/18 138/83   05/17/18 119/75   03/08/18 127/77     Physical Exam   Constitutional: She is oriented to person, place, and time. She appears well-developed and well-nourished. No distress. HENT:   Nose: Right sinus exhibits maxillary sinus tenderness. Right sinus exhibits no frontal sinus tenderness. Left sinus exhibits maxillary sinus tenderness. Left sinus exhibits no frontal sinus tenderness. Cardiovascular: Normal rate, regular rhythm and normal heart sounds. Exam reveals no gallop and no friction rub. No murmur heard. Pulmonary/Chest: Effort normal and breath sounds normal. No respiratory distress. She has no wheezes. She has no rales. Musculoskeletal: She exhibits no edema. Lymphadenopathy:     She has no cervical adenopathy. Neurological: She is alert and oriented to person, place, and time. Skin: Skin is warm and dry. She is not diaphoretic. Nursing note and vitals reviewed. ASSESSMENT and PLAN    ICD-10-CM ICD-9-CM    1. Essential hypertension I10 401.9    2. Acute non-recurrent maxillary sinusitis J01.00 461.0 amoxicillin-clavulanate (AUGMENTIN) 875-125 mg per tablet      chlorpheniramine-dm (CORICIDIN HBP COUGH AND COLD) 4-30 mg tab   3. Hematuria, unspecified type R31.9 599.70 URINALYSIS W/MICROSCOPIC      AMB POC URINALYSIS DIP STICK AUTO W/O MICRO        Blood pressure controlled  Hematuria on dip  Labs per orders. Rest, push fluids  Augmentin    Follow-up Disposition:  Return in about 6 months (around 8/19/2019) for blood pressure, if sinus symptoms not better in 10 days. Reviewed plan of care. Patient has provided input and agrees with goals.

## 2019-02-20 LAB — SPECIMEN STATUS REPORT, ROLRST: NORMAL

## 2019-02-21 DIAGNOSIS — E78.00 HYPERCHOLESTEREMIA: ICD-10-CM

## 2019-02-21 DIAGNOSIS — I10 ESSENTIAL HYPERTENSION: ICD-10-CM

## 2019-02-24 DIAGNOSIS — E78.00 HYPERCHOLESTEREMIA: ICD-10-CM

## 2019-02-25 NOTE — TELEPHONE ENCOUNTER
Regarding: Prescription Question  Contact: [phone]  ----- Message from [address] [place] [address] [address], Generic sent at 2/24/2019 12:25 PM EST -----    [facility]rav[facility]. They claim they made the request. Because of this I have also made a refill request thru the [employer] [facility]. As of today I have 5 days worth of medicine left.      Thanks for taking care of this,  Yoni Dunn

## 2019-02-26 RX ORDER — PRAVASTATIN SODIUM 20 MG/1
TABLET ORAL
Qty: 90 TAB | Refills: 2 | OUTPATIENT
Start: 2019-02-26

## 2019-02-26 RX ORDER — RAMIPRIL 2.5 MG/1
CAPSULE ORAL
Qty: 90 CAP | Refills: 1 | Status: SHIPPED | OUTPATIENT
Start: 2019-02-26 | End: 2019-11-19 | Stop reason: SDUPTHER

## 2019-02-26 RX ORDER — HYDROCHLOROTHIAZIDE 12.5 MG/1
CAPSULE ORAL
Qty: 90 CAP | Refills: 1 | Status: SHIPPED | OUTPATIENT
Start: 2019-02-26 | End: 2019-09-05 | Stop reason: DRUGHIGH

## 2019-02-26 RX ORDER — PRAVASTATIN SODIUM 20 MG/1
TABLET ORAL
Qty: 90 TAB | Refills: 2 | Status: SHIPPED | OUTPATIENT
Start: 2019-02-26 | End: 2019-11-19 | Stop reason: SDUPTHER

## 2019-05-17 ENCOUNTER — OFFICE VISIT (OUTPATIENT)
Dept: FAMILY MEDICINE CLINIC | Age: 64
End: 2019-05-17

## 2019-05-17 VITALS
SYSTOLIC BLOOD PRESSURE: 116 MMHG | TEMPERATURE: 97.6 F | DIASTOLIC BLOOD PRESSURE: 80 MMHG | HEART RATE: 66 BPM | BODY MASS INDEX: 32.95 KG/M2 | HEIGHT: 66 IN | WEIGHT: 205 LBS | RESPIRATION RATE: 18 BRPM

## 2019-05-17 DIAGNOSIS — M25.611 DECREASED RIGHT SHOULDER RANGE OF MOTION: ICD-10-CM

## 2019-05-17 DIAGNOSIS — M75.21 BICEPS TENDINITIS OF RIGHT UPPER EXTREMITY: Primary | ICD-10-CM

## 2019-05-17 RX ORDER — IBUPROFEN 600 MG/1
600 TABLET ORAL
Qty: 40 TAB | Refills: 0 | Status: SHIPPED | OUTPATIENT
Start: 2019-05-17 | End: 2020-09-10

## 2019-05-17 NOTE — PROGRESS NOTES
Chief Complaint   Patient presents with    Arm Pain     rt and rt shoulder x 3 wks; pt states was using hand shovel to plant     1. Have you been to the ER, urgent care clinic since your last visit? Hospitalized since your last visit? No     2. Have you seen or consulted any other health care providers outside of the [address] [place] [address] since your last visit? Include any pap smears or colon screening.  No

## 2019-05-17 NOTE — PROGRESS NOTES
HISTORY OF PRESENT ILLNESS  Liana Habermann is a 61 y.o. female. Liana Habermann presents with right shoulder and arm pain about 2-3 weeks. It started after planting plants in the yard with the hand spade. She has never had this problem before. Elevating the arm past 90 degrees plus internal and external rotation make it worse. The pain is a little better. She has tried Tylenol without relief. Heat helps. Review of Systems   Constitutional: Negative for chills and fever. Musculoskeletal: Negative for neck pain. Neurological: Positive for tingling and sensory change. Negative for focal weakness. Mild numbness to tingling in the arm       Visit Vitals  /80   Pulse 66   Temp 97.6 °F (36.4 °C) (Oral)   Resp 18   Ht 5' 6\" (1.676 m)   Wt 205 lb (93 kg)   BMI 33.09 kg/m²     Physical Exam   Constitutional: She is oriented to person, place, and time. She appears well-developed and well-nourished. No distress. Cardiovascular: Normal rate, regular rhythm and normal heart sounds. Exam reveals no gallop and no friction rub. No murmur heard. Pulmonary/Chest: Effort normal and breath sounds normal.   Musculoskeletal:        Right shoulder: She exhibits decreased range of motion, tenderness and pain. She exhibits no bony tenderness, no deformity and normal strength. Cervical back: She exhibits decreased range of motion. She exhibits no tenderness, no bony tenderness, no swelling, no deformity, no pain and no spasm. Right upper arm: She exhibits no tenderness. Tenderness over the biceps tendon   Neurological: She is alert and oriented to person, place, and time. She has normal strength. No sensory deficit. Reflex Scores:       Tricep reflexes are 2+ on the right side and 2+ on the left side. Bicep reflexes are 2+ on the right side and 2+ on the left side. Brachioradialis reflexes are 2+ on the right side and 2+ on the left side. Skin: Skin is warm and dry.  She is not diaphoretic. ASSESSMENT and PLAN    ICD-10-CM ICD-9-CM    1. Biceps tendinitis of right upper extremity M75.21 726.12 ibuprofen (MOTRIN) 600 mg tablet      REFERRAL TO PHYSICAL THERAPY   2. Decreased right shoulder range of motion M25.611 719.51 REFERRAL TO PHYSICAL THERAPY        Needs PT to get her range of motion back  Limited amount of Motrin prn as she had issues with her GFR this past summer  Heat alternating with ice  PT referral    Follow-up and Dispositions    · Return if symptoms worsen or fail to improve. Reviewed plan of care. Patient has provided input and agrees with goals.

## 2019-06-05 ENCOUNTER — HOSPITAL ENCOUNTER (OUTPATIENT)
Dept: PHYSICAL THERAPY | Age: 64
Discharge: HOME | End: 2019-06-05
Payer: COMMERCIAL

## 2019-06-05 ENCOUNTER — HOSPITAL ENCOUNTER (OUTPATIENT)
Dept: CT IMAGING | Age: 64
Discharge: HOME | End: 2019-06-05
Payer: SELF-PAY

## 2019-06-05 DIAGNOSIS — Z29.9 PREVENTIVE MEASURE: ICD-10-CM

## 2019-06-05 PROCEDURE — 97110 THERAPEUTIC EXERCISES: CPT

## 2019-06-05 PROCEDURE — 97110 THERAPEUTIC EXERCISES: CPT | Performed by: PHYSICAL THERAPIST

## 2019-06-05 PROCEDURE — 97014 ELECTRIC STIMULATION THERAPY: CPT

## 2019-06-05 PROCEDURE — 97014 ELECTRIC STIMULATION THERAPY: CPT | Performed by: PHYSICAL THERAPIST

## 2019-06-05 PROCEDURE — 75571 CT HRT W/O DYE W/CA TEST: CPT

## 2019-06-05 PROCEDURE — 97161 PT EVAL LOW COMPLEX 20 MIN: CPT

## 2019-06-05 PROCEDURE — 97161 PT EVAL LOW COMPLEX 20 MIN: CPT | Performed by: PHYSICAL THERAPIST

## 2019-06-05 NOTE — PROGRESS NOTES
PT INITIAL EVALUATION NOTE - [facility] 2-15    Patient Name: Miladis Person  Date:2019  : 1955  [x]  Patient  Verified  Payor: BLUE [employer]EN / Plan: Peter Lozano [id number] PPO / Product Type: PPO /    In time:1:30  Out time:2:30  Total Treatment Time (min): 60  Total Timed Codes (min): 30  1:1 Treatment Time ( only): 60   Visit #: 1     Treatment Area: Bicipital tendinitis, right shoulder [M75.21]  Right shoulder pain [M25.511]    SUBJECTIVE  Pain Level (0-10 scale): 2 at rest   Any medication changes, allergies to medications, adverse drug reactions, diagnosis change, or new procedure performed?: [] No    [x] Yes (see summary sheet for update)  Subjective:    Pt complains of R shoulder that goes down to arm with numbness   PLOF: bowling, gardening, quilting with no pain or limitation  Mechanism of Injury: pain onset after planting flowers 4 weeks ago  Previous Treatment/Compliance: no previous treatment   PMHx/Surgical Hx: none  Work Hx: retired  Living Situation:   Pt Goals:  to return to gardening activities with no shoulder pain or limitation  Barriers: none  Motivation: motivated  Substance use: none  FABQ Score:   Cognition: A & O x 4        OBJECTIVE/EXAMINATION  Posture:   Pt sits with rounded shoulders  Palpation: TTP distal to acromion and proximal biceps   Joint Mobility: Glenohumeral joint mobility WNL       Shoulder AROM, PROM:         R   L  Shoulder Flexion  120 P! WNL        Shoulder Abduction  120P! WNL  Shoulder Extension  25% limited pain! WNL  Shoulder ER   WNL   WNL  Shoulder IR   25% limited pain!  WNL            MMT:   Shoulder flexion   4/5 P!   5/5  Shoulder ER   4/5   5/5  Shoulder IR   5/5   5/5      Special Tests:   Painful Arc:  Positive   Nielsen:  Positive   Neer's:  negative   Empty/Full Can Test: positive empty   Drop Arm: Negative    ER Lag: negative   Lift Off test: Negative     Crank: negative   Biceps Load I: positive         Modality rationale: decrease inflammation, decrease pain and increase tissue extensibility to improve the patients ability to raise arm overhead to brush hair   Min Type Additional Details   15 [x] Estim: []Att   [x]Unatt        []TENS instruct                  [x]IFC  []Premod   []NMES                     []Other:  []w/US   []w/ice   [x]w/heat  Position: seated  Location: R upper trapezius    []  Traction: [] Cervical       []Lumbar                       [] Prone          []Supine                       []Intermittent   []Continuous Lbs:  [] before manual  [] after manual  []w/heat    []  Ultrasound: []Continuous   [] Pulsed at:                           []1MHz   []3MHz Location:  W/cm2:    [] Paraffin         Location:   []w/heat    []  Ice     []  Heat  []  Ice massage Position:  Location:    []  Laser  []  Other: Position:  Location:      []  Vasopneumatic Device Pressure:       [] lo [] med [] hi   Temperature:      [x] Skin assessment post-treatment:  [x]intact []redness- no adverse reaction    []redness  adverse reaction:     30 min Therapeutic Exercise:  [x] See flow sheet :   Rationale: increase ROM and increase strength to improve the patients ability to raise her arm overhead without pain            With   [] TE   [] TA   [] neuro   [] other: Patient Education: [x] Review HEP    [] Progressed/Changed HEP based on:   [] positioning   [] body mechanics   [] transfers   [] heat/ice application    [] other:      Other Objective/Functional Measures:     Pain Level (0-10 scale) post treatment: 2    ASSESSMENT/Changes in Function:     [x]  See Plan of Lesia Arzate 61 6/5/2019

## 2019-06-05 NOTE — PROGRESS NOTES
[address] [facility] Radha Weston  Phone: [phone]  Fax: [phone]    Plan of Care/ Statement of Necessity for Physical Therapy Services 2-15    Patient name: Kamila Cleary  : 1955  Provider#: [id number]  Referral source: Ankita Markham MD      Medical/Treatment Diagnosis: Bicipital tendinitis, right shoulder [M75.21]  Right shoulder pain [M25.511]     Prior Hospitalization: see medical history     Comorbidities: none  Prior Level of Function: gardening and bowling with no pain or limitation  Medications: Verified on Patient Summary List    Start of Care: 2019     Onset Date: 2019       The Plan of Care and following information is based on the information from the initial evaluation. Assessment/ key information: Pt complains of R anterolateral shoulder pain that began when digging soil for plants one month ago. Today pt presents with shoulder pain, decreased range of motion, weakness, tenderness to palpation, and inability to raise arm. These impairments are limiting the patient's ability to get dressed, perform yard work, and go bowling. Pt would benefit from skilled PT to address these functional limitations.    Evaluation Complexity History LOW Complexity : Zero comorbidities / personal factors that will impact the outcome / POC; Examination LOW Complexity : 1-2 Standardized tests and measures addressing body structure, function, activity limitation and / or participation in recreation  ;Presentation LOW Complexity : Stable, uncomplicated  ;Clinical Decision Making LOW Complexity : FOTO score of   Overall Complexity Rating: LOW     Problem List: pain affecting function, decrease ROM, decrease strength, decrease ADL/ functional abilitiies, decrease activity tolerance and decrease flexibility/ joint mobility   Treatment Plan may include any combination of the following: Therapeutic exercise, Therapeutic activities, Neuromuscular re-education, Physical agent/modality, Manual therapy, Self Care training and Functional mobility training  Patient / Family readiness to learn indicated by: asking questions, trying to perform skills and interest  Persons(s) to be included in education: patient (P)  Barriers to Learning/Limitations: None  Patient Goal (s): I want to return to gardening with no pain in shoulder  Patient Self Reported Health Status: good  Rehabilitation Potential: excellent    Short Term Goals: To be accomplished in 8 treatments:  Pt will be able to reach overhead with 0/10 pain in order to improve ability to brush hair  Pt will be able to reach behind back with 0/10 pain to improve ability to put on belt  Pt will be able to lift 5lb weight to shoulder level to improve ability to perform yard work  Long Term Goals: To be accomplished in 16 treatments:  Pt will report being able to garden for 1 hour with 0/10 pain in shoulder  Pt will be able to lift 10lbs to shoulder level to improve ability to put a gallon of milk on the shelf  Pt will report being able to lift a 20lb object from the ground to waist level to improve ability to carry groceries   Frequency / Duration: Patient to be seen 2 times per week for 8 weeks. Patient/ Caregiver education and instruction: self care, activity modification and exercises    [x]  Plan of care has been reviewed with SHAYY Brewer 6/5/2019     ________________________________________________________________________    I certify that the above Therapy Services are being furnished while the patient is under my care. I agree with the treatment plan and certify that this therapy is necessary.     [de-identified] Signature:____________________  Date:____________Time: _________

## 2019-06-06 NOTE — CARDIO/PULMONARY
Cardiopulmonary Rehab: I reached this patient by phone and shared her coronary calcium CT score of \" 93\" with her. Education given regarding coronary artery disease and its effects on the cardiovascular system were reviewed. Patient states that she does not have a family history of coronary artery disease, that she does not have diabetes, and  states she is not a nicotine user. Patient reports she is currently requiring cholesterol medication and blood pressure medication. Patient reports being of overweight and on a weight loss plan, reports making heart healthy diet choices on most days and is regularly physically active. Patient does not feel that stress is a risk factor for her. Patient to follow up with primary care physician. Understanding verbalized and no further questions at this time.

## 2019-06-13 ENCOUNTER — HOSPITAL ENCOUNTER (OUTPATIENT)
Dept: PHYSICAL THERAPY | Age: 64
Discharge: HOME | End: 2019-06-13
Payer: COMMERCIAL

## 2019-06-13 PROCEDURE — 97014 ELECTRIC STIMULATION THERAPY: CPT

## 2019-06-13 PROCEDURE — 97110 THERAPEUTIC EXERCISES: CPT

## 2019-06-13 NOTE — PROGRESS NOTES
PT DAILY TREATMENT NOTE - [facility] 2-15    Patient Name: Ruth Hancock  Date:2019  : 1955  [x]  Patient  Verified  Payor: Ahmet Wesley / Plan: Peter Lozano [id number] PPO / Product Type: PPO /    In time:12:00  Out time:1:00p  Total Treatment Time (min): 60  Total Timed Codes (min): 45  1:1 Treatment Time (MC only): 30   Visit #:  2    Treatment Area: Bicipital tendinitis, right shoulder [M75.21]  Right shoulder pain [M25.511]    SUBJECTIVE  Pain Level (0-10 scale): 2  Any medication changes, allergies to medications, adverse drug reactions, diagnosis change, or new procedure performed?: [x] No    [] Yes (see summary sheet for update)  Subjective functional status/changes:   [] No changes reported  Patient reports compliance with HEP.  Patient reports her neck has also been more stiff and sore recently    OBJECTIVE    Modality rationale: decrease pain and increase tissue extensibility to improve the patients ability to lift, carry, reach and complete ADL's   Min Type Additional Details      15 [x] Estim: []Att   [x]Unatt    []TENS instruct                  [x]IFC  []Premod   []NMES                     []Other:  []w/US   []w/ice   [x]w/heat  Position: reclined  Location:neck       []  Traction: [] Cervical       []Lumbar                       [] Prone          []Supine                       []Intermittent   []Continuous Lbs:  [] before manual  [] after manual  []w/heat    []  Ultrasound: []Continuous   [] Pulsed                       at: []1MHz   []3MHz Location:  W/cm2:    [] Paraffin         Location:   []w/heat    []  Ice     []  Heat  []  Ice massage Position:  Location:    []  Laser  []  Other: Position:  Location:      []  Vasopneumatic Device Pressure:       [] lo [] med [] hi   Temperature:      [x] Skin assessment post-treatment:  [x]intact []redness- no adverse reaction    []redness  adverse reaction:     35 min Therapeutic Exercise:  [] See flow sheet :   Rationale: increase ROM, increase strength, improve coordination, improve balance and increase proprioception to improve the patients ability to lift, carry, reach and complete ADL's    10 min Manual Therapy: MFR, UT, Levator, scapular gliding    Rationale: decrease pain, increase ROM, increase tissue extensibility and decrease trigger points to improve the patients ability to lift, carry, reach and complete ADL's    With   [] TE   [] TA   [] neuro   [] other: Patient Education: [x] Review HEP    [] Progressed/Changed HEP based on:   [] positioning   [] body mechanics   [] transfers   [] heat/ice application    [] other:      Other Objective/Functional Measures:      Pain Level (0-10 scale) post treatment: 2    ASSESSMENT/Changes in Function:     Patient will continue to benefit from skilled PT services to modify and progress therapeutic interventions, address functional mobility deficits, address ROM deficits, address strength deficits, analyze and address soft tissue restrictions, analyze and cue movement patterns, analyze and modify body mechanics/ergonomics and assess and modify postural abnormalities to attain remaining goals. []  See Plan of Care  []  See progress note/recertification  []  See Discharge Summary         Progress towards goals / Updated goals:  Patient able to advance exercises with no increased pain and demonstrates improved ROM this visit.      PLAN  [x]  Upgrade activities as tolerated     [x]  Continue plan of care  []  Update interventions per flow sheet       []  Discharge due to:_  []  Other:_      Jordan Short 6/13/2019

## 2019-06-18 ENCOUNTER — HOSPITAL ENCOUNTER (OUTPATIENT)
Dept: PHYSICAL THERAPY | Age: 64
Discharge: HOME | End: 2019-06-18
Payer: COMMERCIAL

## 2019-06-18 PROCEDURE — 97014 ELECTRIC STIMULATION THERAPY: CPT

## 2019-06-18 PROCEDURE — 97140 MANUAL THERAPY 1/> REGIONS: CPT

## 2019-06-18 PROCEDURE — 97110 THERAPEUTIC EXERCISES: CPT

## 2019-06-18 NOTE — PROGRESS NOTES
PT DAILY TREATMENT NOTE - [facility] 2-15    Patient Name: Larry Pendleton  Date:2019  : 1955  [x]  Patient  Verified  Payor: BLUE CROSS / Plan: [place] [facility] PPO / Product Type: PPO /    In time:11:00  Out time:12:00p  Total Treatment Time (min): 60  Total Timed Codes (min): 45  1:1 Treatment Time ( only): 45   Visit #:  3    Treatment Area: Bicipital tendinitis, right shoulder [M75.21]  Right shoulder pain [M25.511]    SUBJECTIVE  Pain Level (0-10 scale): 2  Any medication changes, allergies to medications, adverse drug reactions, diagnosis change, or new procedure performed?: [x] No    [] Yes (see summary sheet for update)  Subjective functional status/changes:   [] No changes reported  Patient reports she did too much cleaning on Saturday and is hurting more today. Patient states she felt better after last session.     OBJECTIVE    Modality rationale: decrease pain and increase tissue extensibility to improve the patients ability to lift, carry, reach and complete ADL's   Min Type Additional Details      15 [x] Estim: []Att   [x]Unatt    []TENS instruct                  [x]IFC  []Premod   []NMES                     []Other:  []w/US   []w/ice   [x]w/heat  Position: reclined  Location:neck       []  Traction: [] Cervical       []Lumbar                       [] Prone          []Supine                       []Intermittent   []Continuous Lbs:  [] before manual  [] after manual  []w/heat    []  Ultrasound: []Continuous   [] Pulsed                       at: []1MHz   []3MHz Location:  W/cm2:    [] Paraffin         Location:   []w/heat    []  Ice     []  Heat  []  Ice massage Position:  Location:    []  Laser  []  Other: Position:  Location:      []  Vasopneumatic Device Pressure:       [] lo [] med [] hi   Temperature:      [x] Skin assessment post-treatment:  [x]intact []redness- no adverse reaction    []redness  adverse reaction:     35 min Therapeutic Exercise:  [] See flow sheet : Rationale: increase ROM, increase strength, improve coordination, improve balance and increase proprioception to improve the patients ability to lift, carry, reach and complete ADL's    10 min Manual Therapy: MFR, UT, Levator, scapular gliding, medial scapular border, shoulder stabilizers    Rationale: decrease pain, increase ROM, increase tissue extensibility and decrease trigger points to improve the patients ability to lift, carry, reach and complete ADL's    With   [] TE   [] TA   [] neuro   [] other: Patient Education: [x] Review HEP    [] Progressed/Changed HEP based on:   [] positioning   [] body mechanics   [] transfers   [] heat/ice application    [] other:      Other Objective/Functional Measures:      Pain Level (0-10 scale) post treatment: 0.5    ASSESSMENT/Changes in Function:     Patient will continue to benefit from skilled PT services to modify and progress therapeutic interventions, address functional mobility deficits, address ROM deficits, address strength deficits, analyze and address soft tissue restrictions, analyze and cue movement patterns, analyze and modify body mechanics/ergonomics and assess and modify postural abnormalities to attain remaining goals. []  See Plan of Care  []  See progress note/recertification  []  See Discharge Summary         Progress towards goals / Updated goals:  Patient able to advance exercises with no increased pain and demonstrates improved ROM this visit.      PLAN  [x]  Upgrade activities as tolerated     [x]  Continue plan of care  []  Update interventions per flow sheet       []  Discharge due to:_  []  Other:_      Render Lone 6/18/2019

## 2019-06-20 ENCOUNTER — HOSPITAL ENCOUNTER (OUTPATIENT)
Dept: PHYSICAL THERAPY | Age: 64
Discharge: HOME | End: 2019-06-20
Payer: COMMERCIAL

## 2019-06-20 PROCEDURE — 97110 THERAPEUTIC EXERCISES: CPT

## 2019-06-20 PROCEDURE — 97140 MANUAL THERAPY 1/> REGIONS: CPT

## 2019-06-20 PROCEDURE — 97014 ELECTRIC STIMULATION THERAPY: CPT

## 2019-06-20 NOTE — PROGRESS NOTES
PT DAILY TREATMENT NOTE - [facility] 2-15    Patient Name: Raghav Alarcon  Date:2019  : 1955  [x]  Patient  Verified  Payor: BLUE CROSS / Plan: [employer] [id number] PPO / Product Type: PPO /    In time:12:00p  Out time:1:00p  Total Treatment Time (min): 60  Total Timed Codes (min): 45  1:1 Treatment Time ( only): 45   Visit #:  4    Treatment Area: Bicipital tendinitis, right shoulder [M75.21]  Right shoulder pain [M25.511]    SUBJECTIVE  Pain Level (0-10 scale): 1  Any medication changes, allergies to medications, adverse drug reactions, diagnosis change, or new procedure performed?: [x] No    [] Yes (see summary sheet for update)  Subjective functional status/changes:   [] No changes reported  Patient states she feels much better today, but does have some pain and tightness in both shoulders L>R today.     OBJECTIVE    Modality rationale: decrease pain and increase tissue extensibility to improve the patients ability to lift, carry, reach and complete ADL's   Min Type Additional Details      15 [x] Estim: []Att   [x]Unatt    []TENS instruct                  [x]IFC  []Premod   []NMES                     []Other:  []w/US   []w/ice   [x]w/heat  Position: reclined  Location:neck       []  Traction: [] Cervical       []Lumbar                       [] Prone          []Supine                       []Intermittent   []Continuous Lbs:  [] before manual  [] after manual  []w/heat    []  Ultrasound: []Continuous   [] Pulsed                       at: []1MHz   []3MHz Location:  W/cm2:    [] Paraffin         Location:   []w/heat    []  Ice     []  Heat  []  Ice massage Position:  Location:    []  Laser  []  Other: Position:  Location:      []  Vasopneumatic Device Pressure:       [] lo [] med [] hi   Temperature:      [x] Skin assessment post-treatment:  [x]intact []redness- no adverse reaction    []redness  adverse reaction:     35 min Therapeutic Exercise:  [x] See flow sheet :   Rationale: increase ROM, increase strength, improve coordination, improve balance and increase proprioception to improve the patients ability to lift, carry, reach and complete ADL's    10 min Manual Therapy: MFR B UT, Levator, scapular gliding, medial scapular border, shoulder stabilizers    Rationale: decrease pain, increase ROM, increase tissue extensibility and decrease trigger points to improve the patients ability to lift, carry, reach and complete ADL's    With   [] TE   [] TA   [] neuro   [] other: Patient Education: [x] Review HEP    [] Progressed/Changed HEP based on:   [] positioning   [] body mechanics   [] transfers   [] heat/ice application    [] other:      Other Objective/Functional Measures:      Pain Level (0-10 scale) post treatment: 0    ASSESSMENT/Changes in Function:     Patient will continue to benefit from skilled PT services to modify and progress therapeutic interventions, address functional mobility deficits, address ROM deficits, address strength deficits, analyze and address soft tissue restrictions, analyze and cue movement patterns, analyze and modify body mechanics/ergonomics and assess and modify postural abnormalities to attain remaining goals. []  See Plan of Care  []  See progress note/recertification  []  See Discharge Summary         Progress towards goals / Updated goals:  Patient making good progress towards goals with improved AROM and exercise/activity tolerance.     PLAN  [x]  Upgrade activities as tolerated     [x]  Continue plan of care  []  Update interventions per flow sheet       []  Discharge due to:_  []  Other:_      Jorge Suarez 6/20/2019

## 2019-06-25 ENCOUNTER — HOSPITAL ENCOUNTER (OUTPATIENT)
Dept: PHYSICAL THERAPY | Age: 64
Discharge: HOME | End: 2019-06-25
Payer: COMMERCIAL

## 2019-06-25 PROCEDURE — 97140 MANUAL THERAPY 1/> REGIONS: CPT

## 2019-06-25 PROCEDURE — 97014 ELECTRIC STIMULATION THERAPY: CPT

## 2019-06-25 PROCEDURE — 97110 THERAPEUTIC EXERCISES: CPT

## 2019-06-25 NOTE — PROGRESS NOTES
PT DAILY TREATMENT NOTE - [facility] 2-15    Patient Name: Cal Gage  Date:2019  : 1955  [x]  Patient  Verified  Payor: BLUE CROSS / Plan: [employer] [id number] PPO / Product Type: PPO /    In time:11:00a  Out time:12:00p  Total Treatment Time (min): 60  Total Timed Codes (min): 45  1:1 Treatment Time ( only): 45   Visit #:  5    Treatment Area: Bicipital tendinitis, right shoulder [M75.21]  Right shoulder pain [M25.511]    SUBJECTIVE  Pain Level (0-10 scale): 1  Any medication changes, allergies to medications, adverse drug reactions, diagnosis change, or new procedure performed?: [x] No    [] Yes (see summary sheet for update)  Subjective functional status/changes:   [] No changes reported  Patient reports she was in more pain this weekend due to working on her quilting machine, but was able to manage and decrease her pain with heat and stretches.     OBJECTIVE    Modality rationale: decrease pain and increase tissue extensibility to improve the patients ability to lift, carry, reach and complete ADL's   Min Type Additional Details      15 [x] Estim: []Att   [x]Unatt    []TENS instruct                  [x]IFC  []Premod   []NMES                     []Other:  []w/US   []w/ice   [x]w/heat  Position: reclined  Location:neck       []  Traction: [] Cervical       []Lumbar                       [] Prone          []Supine                       []Intermittent   []Continuous Lbs:  [] before manual  [] after manual  []w/heat    []  Ultrasound: []Continuous   [] Pulsed                       at: []1MHz   []3MHz Location:  W/cm2:    [] Paraffin         Location:   []w/heat    []  Ice     []  Heat  []  Ice massage Position:  Location:    []  Laser  []  Other: Position:  Location:      []  Vasopneumatic Device Pressure:       [] lo [] med [] hi   Temperature:      [x] Skin assessment post-treatment:  [x]intact []redness- no adverse reaction    []redness  adverse reaction:     35 min Therapeutic Exercise: [x] See flow sheet :   Rationale: increase ROM, increase strength, improve coordination, improve balance and increase proprioception to improve the patients ability to lift, carry, reach and complete ADL's    10 min Manual Therapy: MFR B UT, Levator, shoulder stabilizers    Rationale: decrease pain, increase ROM, increase tissue extensibility and decrease trigger points to improve the patients ability to lift, carry, reach and complete ADL's    With   [] TE   [] TA   [] neuro   [] other: Patient Education: [x] Review HEP    [] Progressed/Changed HEP based on:   [] positioning   [] body mechanics   [] transfers   [] heat/ice application    [] other:      Other Objective/Functional Measures:      Pain Level (0-10 scale) post treatment: 0    ASSESSMENT/Changes in Function:     Patient will continue to benefit from skilled PT services to modify and progress therapeutic interventions, address functional mobility deficits, address ROM deficits, address strength deficits, analyze and address soft tissue restrictions, analyze and cue movement patterns, analyze and modify body mechanics/ergonomics and assess and modify postural abnormalities to attain remaining goals. []  See Plan of Care  []  See progress note/recertification  []  See Discharge Summary         Progress towards goals / Updated goals:  Patient making good progress towards goals and is demonstrating improved ROM and decreased pain with reaching across body. Patient able to advance several exercises this visit.     PLAN  [x]  Upgrade activities as tolerated     [x]  Continue plan of care  []  Update interventions per flow sheet       []  Discharge due to:_  []  Other:_      Mitesh Sanchez 6/25/2019

## 2019-06-27 ENCOUNTER — HOSPITAL ENCOUNTER (OUTPATIENT)
Dept: PHYSICAL THERAPY | Age: 64
Discharge: HOME | End: 2019-06-27
Payer: COMMERCIAL

## 2019-06-27 PROCEDURE — 97014 ELECTRIC STIMULATION THERAPY: CPT

## 2019-06-27 PROCEDURE — 97140 MANUAL THERAPY 1/> REGIONS: CPT

## 2019-06-27 PROCEDURE — 97110 THERAPEUTIC EXERCISES: CPT

## 2019-06-27 NOTE — PROGRESS NOTES
PT DAILY TREATMENT NOTE - [facility] 2-15    Patient Name: Chanel Ch  Date:2019  : 1955  [x]  Patient  Verified  Payor: BLUE [employer] / Plan: [employer] [id number] PPO / Product Type: PPO /    In time:9:50a  Out time:11:00a  Total Treatment Time (min): 70  Total Timed Codes (min): 55  1:1 Treatment Time ( only): 40   Visit #:  6    Treatment Area: Bicipital tendinitis, right shoulder [M75.21]  Right shoulder pain [M25.511]    SUBJECTIVE  Pain Level (0-10 scale): 1  Any medication changes, allergies to medications, adverse drug reactions, diagnosis change, or new procedure performed?: [x] No    [] Yes (see summary sheet for update)  Subjective functional status/changes:   [] No changes reported  Patient reports she has a \"hot spot\" posterior shouder and some irritation theough the anterior shoulder today.     OBJECTIVE    Modality rationale: decrease pain and increase tissue extensibility to improve the patients ability to lift, carry, reach and complete ADL's   Min Type Additional Details      15 [x] Estim: []Att   [x]Unatt    []TENS instruct                  [x]IFC  []Premod   []NMES                     []Other:  []w/US   []w/ice   [x]w/heat  Position: reclined  Location:neck       []  Traction: [] Cervical       []Lumbar                       [] Prone          []Supine                       []Intermittent   []Continuous Lbs:  [] before manual  [] after manual  []w/heat    []  Ultrasound: []Continuous   [] Pulsed                       at: []1MHz   []3MHz Location:  W/cm2:    [] Paraffin         Location:   []w/heat    []  Ice     []  Heat  []  Ice massage Position:  Location:    []  Laser  []  Other: Position:  Location:      []  Vasopneumatic Device Pressure:       [] lo [] med [] hi   Temperature:      [x] Skin assessment post-treatment:  [x]intact []redness- no adverse reaction    []redness  adverse reaction:     45 min Therapeutic Exercise:  [x] See flow sheet :   Rationale: increase ROM, increase strength, improve coordination, improve balance and increase proprioception to improve the patients ability to lift, carry, reach and complete ADL's    10 min Manual Therapy: MFR B UT, Levator, shoulder stabilizers, PROM all directions to tolerance, mobs with movement abduction and flexion    Rationale: decrease pain, increase ROM, increase tissue extensibility and decrease trigger points to improve the patients ability to lift, carry, reach and complete ADL's    With   [] TE   [] TA   [] neuro   [] other: Patient Education: [x] Review HEP    [] Progressed/Changed HEP based on:   [] positioning   [] body mechanics   [] transfers   [] heat/ice application    [] other:      Other Objective/Functional Measures:  Continued pain at end range ROM flexion and abuction    Pain Level (0-10 scale) post treatment: 0    ASSESSMENT/Changes in Function:     Patient will continue to benefit from skilled PT services to modify and progress therapeutic interventions, address functional mobility deficits, address ROM deficits, address strength deficits, analyze and address soft tissue restrictions, analyze and cue movement patterns, analyze and modify body mechanics/ergonomics and assess and modify postural abnormalities to attain remaining goals. []  See Plan of Care  []  See progress note/recertification  []  See Discharge Summary         Progress towards goals / Updated goals:  Patient making good progress towards goals and is demonstrating improved ROM and decreased pain with reaching across body. Patient able to advance several exercises this visit.     PLAN  [x]  Upgrade activities as tolerated     [x]  Continue plan of care  []  Update interventions per flow sheet       []  Discharge due to:_  []  Other:_      Maurice Hills 6/27/2019

## 2019-07-01 ENCOUNTER — HOSPITAL ENCOUNTER (OUTPATIENT)
Dept: PHYSICAL THERAPY | Age: 64
Discharge: HOME | End: 2019-07-01
Payer: COMMERCIAL

## 2019-07-01 PROCEDURE — 97014 ELECTRIC STIMULATION THERAPY: CPT

## 2019-07-01 PROCEDURE — 97110 THERAPEUTIC EXERCISES: CPT

## 2019-07-01 PROCEDURE — 97140 MANUAL THERAPY 1/> REGIONS: CPT

## 2019-07-01 NOTE — PROGRESS NOTES
PT DAILY TREATMENT NOTE - [facility] 2-15    Patient Name: Nicole Wang  Date:2019  : 1955  [x]  Patient  Verified  Payor: BLUE CROSS / Plan: [employer] [id number] PPO / Product Type: PPO /    In time: 10:30a  Out time:11:40a  Total Treatment Time (min): 70  Total Timed Codes (min): 55  1:1 Treatment Time ( only): 30   Visit #:  7    Treatment Area: Bicipital tendinitis, right shoulder [M75.21]  Right shoulder pain [M25.511]    SUBJECTIVE  Pain Level (0-10 scale): 0  Any medication changes, allergies to medications, adverse drug reactions, diagnosis change, or new procedure performed?: [x] No    [] Yes (see summary sheet for update)  Subjective functional status/changes:   [] No changes reported  Patient reports she still has increased difficulty with reaching across body and behind back.     OBJECTIVE    Modality rationale: decrease pain and increase tissue extensibility to improve the patients ability to lift, carry, reach and complete ADL's   Min Type Additional Details      15 [x] Estim: []Att   [x]Unatt    []TENS instruct                  [x]IFC  []Premod   []NMES                     []Other:  []w/US   []w/ice   [x]w/heat  Position: reclined  Location:neck       []  Traction: [] Cervical       []Lumbar                       [] Prone          []Supine                       []Intermittent   []Continuous Lbs:  [] before manual  [] after manual  []w/heat    []  Ultrasound: []Continuous   [] Pulsed                       at: []1MHz   []3MHz Location:  W/cm2:    [] Paraffin         Location:   []w/heat    []  Ice     []  Heat  []  Ice massage Position:  Location:    []  Laser  []  Other: Position:  Location:      []  Vasopneumatic Device Pressure:       [] lo [] med [] hi   Temperature:      [x] Skin assessment post-treatment:  [x]intact []redness- no adverse reaction    []redness - adverse reaction:     45 min Therapeutic Exercise:  [x] See flow sheet :   Rationale: increase ROM, increase strength, improve coordination, improve balance and increase proprioception to improve the patients ability to lift, carry, reach and complete ADL's    10 min Manual Therapy: MFR B UT, Levator, shoulder stabilizers, PROM all directions to tolerance, mobs with movement abduction and flexion    Rationale: decrease pain, increase ROM, increase tissue extensibility and decrease trigger points to improve the patients ability to lift, carry, reach and complete ADL's    With   [] TE   [] TA   [] neuro   [] other: Patient Education: [x] Review HEP    [] Progressed/Changed HEP based on:   [] positioning   [] body mechanics   [] transfers   [] heat/ice application    [] other:      Other Objective/Functional Measures:     AROM L shoulder;  Flexion:145  Abduction: 125  Extension: 48  ER: WNL  FIR: T12    MMT:   Flexion:5/5  ER: 5/5  IR:5/5      Pain Level (0-10 scale) post treatment: 0    ASSESSMENT/Changes in Function:     Patient will continue to benefit from skilled PT services to modify and progress therapeutic interventions, address functional mobility deficits, address ROM deficits, address strength deficits, analyze and address soft tissue restrictions, analyze and cue movement patterns, analyze and modify body mechanics/ergonomics and assess and modify postural abnormalities to attain remaining goals. []  See Plan of Care  []  See progress note/recertification  []  See Discharge Summary         Progress towards goals / Updated goals:  Patient making good progress towards goals and is demonstrating improved ROM and decreased pain with reaching across body. Patient able to advance several exercises this visit. Patient will do well with continued therapy 1x week for 3-4 weeks to reach remaining goals. Short Term Goals:  To be accomplished in 8 treatments:  Pt will be able to reach overhead with 0/10 pain in order to improve ability to brush hair Met  Pt will be able to reach behind back with 0/10 pain to improve ability to put on belt Progressing  Pt will be able to lift 5lb weight to shoulder level to improve ability to perform yard work Met  Long Term Goals: To be accomplished in 16 treatments:  Pt will report being able to garden for 1 hour with 0/10 pain in shoulder  Pt will be able to lift 10lbs to shoulder level to improve ability to put a gallon of milk on the shelf  Pt will report being able to lift a 20lb object from the ground to waist level to improve ability to carry groceries   Frequency / Duration: Patient to be seen 2 times per week for 8 weeks.         PLAN  [x]  Upgrade activities as tolerated     [x]  Continue plan of care  []  Update interventions per flow sheet       []  Discharge due to:_  []  Other:_      Samuel Aquino 7/1/2019

## 2019-07-03 ENCOUNTER — HOSPITAL ENCOUNTER (OUTPATIENT)
Dept: PHYSICAL THERAPY | Age: 64
Discharge: HOME | End: 2019-07-03
Payer: COMMERCIAL

## 2019-07-03 PROCEDURE — 97110 THERAPEUTIC EXERCISES: CPT

## 2019-07-03 PROCEDURE — 97014 ELECTRIC STIMULATION THERAPY: CPT

## 2019-07-03 PROCEDURE — 97140 MANUAL THERAPY 1/> REGIONS: CPT

## 2019-07-03 NOTE — PROGRESS NOTES
PT DAILY TREATMENT NOTE - [facility] 2-15    Patient Name: Blanco Sosa  Date:7/3/2019  : 1955  [x]  Patient  Verified  Payor: BLUE CROSS / Plan: [employer] [id number] PPO / Product Type: PPO /    In time: 10:30a  Out time:11:40a  Total Treatment Time (min): 70  Total Timed Codes (min): 55  1:1 Treatment Time (MC only): 30   Visit #:  8    Treatment Area: Bicipital tendinitis, right shoulder [M75.21]  Right shoulder pain [M25.511]    SUBJECTIVE  Pain Level (0-10 scale): 0.5  Any medication changes, allergies to medications, adverse drug reactions, diagnosis change, or new procedure performed?: [x] No    [] Yes (see summary sheet for update)  Subjective functional status/changes:   [x] No changes reported  Patient states reaching behind back is getting better.     OBJECTIVE    Modality rationale: decrease pain and increase tissue extensibility to improve the patients ability to lift, carry, reach and complete ADL's   Min Type Additional Details      15 [x] Estim: []Att   [x]Unatt    []TENS instruct                  [x]IFC  []Premod   []NMES                     []Other:  []w/US   []w/ice   [x]w/heat  Position: reclined  Location:neck       []  Traction: [] Cervical       []Lumbar                       [] Prone          []Supine                       []Intermittent   []Continuous Lbs:  [] before manual  [] after manual  []w/heat    []  Ultrasound: []Continuous   [] Pulsed                       at: []1MHz   []3MHz Location:  W/cm2:    [] Paraffin         Location:   []w/heat    []  Ice     []  Heat  []  Ice massage Position:  Location:    []  Laser  []  Other: Position:  Location:      []  Vasopneumatic Device Pressure:       [] lo [] med [] hi   Temperature:      [x] Skin assessment post-treatment:  [x]intact []redness- no adverse reaction    []redness - adverse reaction:     45 min Therapeutic Exercise:  [x] See flow sheet :   Rationale: increase ROM, increase strength, improve coordination, improve balance and increase proprioception to improve the patients ability to lift, carry, reach and complete ADL's    10 min Manual Therapy: MFR B UT, Levator, shoulder stabilizers, PROM all directions to tolerance, mobs with movement abduction and flexion    Rationale: decrease pain, increase ROM, increase tissue extensibility and decrease trigger points to improve the patients ability to lift, carry, reach and complete ADL's    With   [] TE   [] TA   [] neuro   [] other: Patient Education: [x] Review HEP    [] Progressed/Changed HEP based on:   [] positioning   [] body mechanics   [] transfers   [] heat/ice application    [] other:      Other Objective/Functional Measures:     AROM L shoulder:  Flexion:145  Abduction: 125  Extension: 48  ER: WNL  FIR: T12    MMT:   Flexion:5/5  ER: 5/5  IR:5/5      Pain Level (0-10 scale) post treatment: 0    ASSESSMENT/Changes in Function:     Patient will continue to benefit from skilled PT services to modify and progress therapeutic interventions, address functional mobility deficits, address ROM deficits, address strength deficits, analyze and address soft tissue restrictions, analyze and cue movement patterns, analyze and modify body mechanics/ergonomics and assess and modify postural abnormalities to attain remaining goals. []  See Plan of Care  []  See progress note/recertification  []  See Discharge Summary         Progress towards goals / Updated goals:  Patient demonstrates good tolerance with decreased fatigue this visit. Patient will do well with continued progression as tolerated next visit. Short Term Goals: To be accomplished in 8 treatments:  Pt will be able to reach overhead with 0/10 pain in order to improve ability to brush hair Met  Pt will be able to reach behind back with 0/10 pain to improve ability to put on belt Progressing  Pt will be able to lift 5lb weight to shoulder level to improve ability to perform yard work Met  Long Term Goals:  To be accomplished in 16 treatments:  Pt will report being able to garden for 1 hour with 0/10 pain in shoulder Met  Pt will be able to lift 10lbs to shoulder level to improve ability to put a gallon of milk on the shelf  Pt will report being able to lift a 20lb object from the ground to waist level to improve ability to carry groceries   Frequency / Duration: Patient to be seen 2 times per week for 8 weeks.         PLAN  [x]  Upgrade activities as tolerated     [x]  Continue plan of care  []  Update interventions per flow sheet       []  Discharge due to:_  []  Other:_      Elvis Chowdhury 7/3/2019

## 2019-07-07 DIAGNOSIS — E03.9 ACQUIRED HYPOTHYROIDISM: ICD-10-CM

## 2019-07-08 ENCOUNTER — HOSPITAL ENCOUNTER (OUTPATIENT)
Dept: PHYSICAL THERAPY | Age: 64
Discharge: HOME | End: 2019-07-08
Payer: COMMERCIAL

## 2019-07-08 PROCEDURE — 97110 THERAPEUTIC EXERCISES: CPT

## 2019-07-08 PROCEDURE — 97140 MANUAL THERAPY 1/> REGIONS: CPT

## 2019-07-08 PROCEDURE — 97014 ELECTRIC STIMULATION THERAPY: CPT

## 2019-07-08 NOTE — PROGRESS NOTES
PT DAILY TREATMENT NOTE - [facility] 2-15    Patient Name: Karis Munguia  Date:2019  : 1955  [x]  Patient  Verified  Payor: BLUE CROSS / Plan: Peter Lozano [id number] PPO / Product Type: PPO /    In time: 10:30a  Out time:11:40a  Total Treatment Time (min): 70  Total Timed Codes (min): 55  1:1 Treatment Time ( only): 30   Visit #:  9    Treatment Area: Bicipital tendinitis, right shoulder [M75.21]  Right shoulder pain [M25.511]    SUBJECTIVE  Pain Level (0-10 scale): 0  Any medication changes, allergies to medications, adverse drug reactions, diagnosis change, or new procedure performed?: [x] No    [] Yes (see summary sheet for update)  Subjective functional status/changes:   [x] No changes reported  Patient reports she feels a little tight from the long weekend, but is doing good and happy with her progress so far.      OBJECTIVE    Modality rationale: decrease pain and increase tissue extensibility to improve the patients ability to lift, carry, reach and complete ADL's   Min Type Additional Details      15 [x] Estim: []Att   [x]Unatt    []TENS instruct                  [x]IFC  []Premod   []NMES                     []Other:  []w/US   []w/ice   [x]w/heat  Position: reclined  Location:neck       []  Traction: [] Cervical       []Lumbar                       [] Prone          []Supine                       []Intermittent   []Continuous Lbs:  [] before manual  [] after manual  []w/heat    []  Ultrasound: []Continuous   [] Pulsed                       at: []1MHz   []3MHz Location:  W/cm2:    [] Paraffin         Location:   []w/heat    []  Ice     []  Heat  []  Ice massage Position:  Location:    []  Laser  []  Other: Position:  Location:      []  Vasopneumatic Device Pressure:       [] lo [] med [] hi   Temperature:      [x] Skin assessment post-treatment:  [x]intact []redness- no adverse reaction    []redness - adverse reaction:     45 min Therapeutic Exercise:  [x] See flow sheet :   Rationale: increase ROM, increase strength, improve coordination, improve balance and increase proprioception to improve the patients ability to lift, carry, reach and complete ADL's    10 min Manual Therapy: MFR B UT, Levator, shoulder stabilizers, PROM all directions to tolerance, mobs with movement abduction and flexion    Rationale: decrease pain, increase ROM, increase tissue extensibility and decrease trigger points to improve the patients ability to lift, carry, reach and complete ADL's    With   [] TE   [] TA   [] neuro   [] other: Patient Education: [x] Review HEP    [] Progressed/Changed HEP based on:   [] positioning   [] body mechanics   [] transfers   [] heat/ice application    [] other:      Other Objective/Functional Measures: foto complete  Improved tolerance and ROM with manual, continued pain at end range with flexion and abduction, tightness felt with ER      Pain Level (0-10 scale) post treatment: 0    ASSESSMENT/Changes in Function:     Patient will continue to benefit from skilled PT services to modify and progress therapeutic interventions, address functional mobility deficits, address ROM deficits, address strength deficits, analyze and address soft tissue restrictions, analyze and cue movement patterns, analyze and modify body mechanics/ergonomics and assess and modify postural abnormalities to attain remaining goals. []  See Plan of Care  []  See progress note/recertification  []  See Discharge Summary         Progress towards goals / Updated goals:  Patient making good progress towards goals with improved overall AROM and is able to perform more chores at home without pain. Short Term Goals:  To be accomplished in 8 treatments:  Pt will be able to reach overhead with 0/10 pain in order to improve ability to brush hair Met  Pt will be able to reach behind back with 0/10 pain to improve ability to put on belt Progressing  Pt will be able to lift 5lb weight to shoulder level to improve ability to perform yard work Met  Long Term Goals: To be accomplished in 16 treatments:  Pt will report being able to garden for 1 hour with 0/10 pain in shoulder Met  Pt will be able to lift 10lbs to shoulder level to improve ability to put a gallon of milk on the shelf  Pt will report being able to lift a 20lb object from the ground to waist level to improve ability to carry groceries   Frequency / Duration: Patient to be seen 2 times per week for 8 weeks.         PLAN  [x]  Upgrade activities as tolerated     [x]  Continue plan of care  []  Update interventions per flow sheet       []  Discharge due to:_  []  Other:_      Maurice Hills 7/8/2019

## 2019-07-08 NOTE — PROGRESS NOTES
[facility]  [address] [facility] [address]  Phone: [phone]      Fax:  [phone]    Progress Note    Name: Mary Carmen Leo   : 1955   MD: Mary Cordon MD       Treatment Diagnosis: Bicipital tendinitis, right shoulder [M75.21]  Right shoulder pain [M25.511]  Start of Care: 19    Visits from Start of Care: 9  Missed Visits: 0    Summary of Care: Therapeutic exercise, manual therapy, modalities for pain control. Assessment / Recommendations:     Patient making good progress towards goals with improved overall AROM and is able to perform more chores at home without pain.     Short Term Goals: To be accomplished in 8 treatments:  Pt will be able to reach overhead with 0/10 pain in order to improve ability to brush hair Met  Pt will be able to reach behind back with 0/10 pain to improve ability to put on belt Progressing  Pt will be able to lift 5lb weight to shoulder level to improve ability to perform yard work [address] be accomplished in 16 treatments:  Pt will report being able to garden for 1 hour with 0/10 pain in shoulder Met  Pt will be able to lift 10lbs to shoulder level to improve ability to put a gallon of milk on the shelf. Progressing towards goal.  Pt will report being able to lift a 20lb object from the ground to waist level to improve ability to carry groceries  Progressing towards goal.  Frequency / Duration: Patient to be seen 2 times per week for 8 weeks.           Gala Mejia, PT , DPT, OCS, Cert. DN   2019    ________________________________________________________________________  NOTE TO PHYSICIAN:  Please complete the following and fax to: [facility]: [phone]  . Retain this original for your records. If you are unable to process this request in 24 hours, please contact our office.        ____ I have read the above report and request that my patient continue therapy with the following changes/special instructions:  ____ I have read the above report and request that my patient be discharged from therapy    Physician's Signature:_________________ Date:___________Time:__________

## 2019-07-13 RX ORDER — LEVOTHYROXINE SODIUM 88 UG/1
TABLET ORAL
Qty: 15 TAB | Refills: 3 | Status: SHIPPED | OUTPATIENT
Start: 2019-07-13 | End: 2019-11-07 | Stop reason: SDUPTHER

## 2019-07-15 ENCOUNTER — HOSPITAL ENCOUNTER (OUTPATIENT)
Dept: PHYSICAL THERAPY | Age: 64
Discharge: HOME | End: 2019-07-15
Payer: COMMERCIAL

## 2019-07-15 PROCEDURE — 97014 ELECTRIC STIMULATION THERAPY: CPT

## 2019-07-15 PROCEDURE — 97140 MANUAL THERAPY 1/> REGIONS: CPT

## 2019-07-15 PROCEDURE — 97110 THERAPEUTIC EXERCISES: CPT

## 2019-07-15 NOTE — PROGRESS NOTES
PT DAILY TREATMENT NOTE - [facility] 2-15    Patient Name: Nilson Garsia  Date:7/15/2019  : 1955  [x]  Patient  Verified  Payor: BLUE CROSS / Plan: Peter Lozano [id number] PPO / Product Type: PPO /    In time: 8:25a  Out time:9:30a  Total Treatment Time (min): 65  Total Timed Codes (min): 50  1:1 Treatment Time ( only): 40   Visit #:  10    Treatment Area: Bicipital tendinitis, right shoulder [M75.21]  Right shoulder pain [M25.511]    SUBJECTIVE  Pain Level (0-10 scale): 0  Any medication changes, allergies to medications, adverse drug reactions, diagnosis change, or new procedure performed?: [x] No    [] Yes (see summary sheet for update)  Subjective functional status/changes:   [x] No changes reported  Patient reports she is able to bowl better with more arm flexion and extension without pain. Patient reports she was able to bowl 3 games, clean her kitchen cabinets and reach across her body without pain this weekend.     OBJECTIVE    Modality rationale: decrease pain and increase tissue extensibility to improve the patients ability to lift, carry, reach and complete ADL's   Min Type Additional Details      15 [x] Estim: []Att   [x]Unatt    []TENS instruct                  [x]IFC  []Premod   []NMES                     []Other:  []w/US   []w/ice   [x]w/heat  Position: reclined  Location:neck       []  Traction: [] Cervical       []Lumbar                       [] Prone          []Supine                       []Intermittent   []Continuous Lbs:  [] before manual  [] after manual  []w/heat    []  Ultrasound: []Continuous   [] Pulsed                       at: []1MHz   []3MHz Location:  W/cm2:    [] Paraffin         Location:   []w/heat    []  Ice     []  Heat  []  Ice massage Position:  Location:    []  Laser  []  Other: Position:  Location:      []  Vasopneumatic Device Pressure:       [] lo [] med [] hi   Temperature:      [x] Skin assessment post-treatment:  [x]intact []redness- no adverse reaction []redness - adverse reaction:     40 min Therapeutic Exercise:  [x] See flow sheet :theracane education and instruction for home use   Rationale: increase ROM, increase strength, improve coordination, improve balance and increase proprioception to improve the patients ability to lift, carry, reach and complete ADL's    10 min Manual Therapy: MFR B UT, Levator, shoulder stabilizers, PROM all directions to tolerance, mobs with movement abduction and flexion    Rationale: decrease pain, increase ROM, increase tissue extensibility and decrease trigger points to improve the patients ability to lift, carry, reach and complete ADL's    With   [] TE   [] TA   [] neuro   [] other: Patient Education: [x] Review HEP    [] Progressed/Changed HEP based on:   [] positioning   [] body mechanics   [] transfers   [] heat/ice application    [] other:      Other Objective/Functional Measures: foto complete  Improved tolerance and ROM with manual, no pain at end range with flexion and abduction, tightness abduction and IR> Flexion, ER      Pain Level (0-10 scale) post treatment: 0    ASSESSMENT/Changes in Function:     Patient will continue to benefit from skilled PT services to modify and progress therapeutic interventions, address functional mobility deficits, address ROM deficits, address strength deficits, analyze and address soft tissue restrictions, analyze and cue movement patterns, analyze and modify body mechanics/ergonomics and assess and modify postural abnormalities to attain remaining goals. []  See Plan of Care  []  See progress note/recertification  []  See Discharge Summary         Progress towards goals / Updated goals:  Patient making good progress towards goals with improved overall AROM and is able to perform more chores at home without pain. Short Term Goals:  To be accomplished in 8 treatments:  Pt will be able to reach overhead with 0/10 pain in order to improve ability to brush hair Met  Pt will be able to reach behind back with 0/10 pain to improve ability to put on belt Progressing  Pt will be able to lift 5lb weight to shoulder level to improve ability to perform yard work Met  Long Term Goals: To be accomplished in 16 treatments:  Pt will report being able to garden for 1 hour with 0/10 pain in shoulder Met  Pt will be able to lift 10lbs to shoulder level to improve ability to put a gallon of milk on the shelf  Pt will report being able to lift a 20lb object from the ground to waist level to improve ability to carry groceries   Frequency / Duration: Patient to be seen 2 times per week for 8 weeks.         PLAN  [x]  Upgrade activities as tolerated     [x]  Continue plan of care  []  Update interventions per flow sheet       []  Discharge due to:_  []  Other:_      Tanvi Calzada 7/15/2019

## 2019-07-16 LAB — MAMMOGRAPHY, EXTERNAL: NORMAL

## 2019-07-23 ENCOUNTER — HOSPITAL ENCOUNTER (OUTPATIENT)
Dept: PHYSICAL THERAPY | Age: 64
Discharge: HOME | End: 2019-07-23
Payer: COMMERCIAL

## 2019-07-23 PROCEDURE — 97014 ELECTRIC STIMULATION THERAPY: CPT

## 2019-07-23 PROCEDURE — 97140 MANUAL THERAPY 1/> REGIONS: CPT

## 2019-07-23 PROCEDURE — 97110 THERAPEUTIC EXERCISES: CPT

## 2019-07-23 NOTE — PROGRESS NOTES
PT DAILY TREATMENT NOTE - [facility] 2-15    Patient Name: Viri Becerra  Date:2019  : 1955  [x]  Patient  Verified  Payor: BLUE CROSS / Plan: Peter Lozano [id number] PPO / Product Type: PPO /    In time: 10:00a  Out time:9:00a  Total Treatment Time (min): 60  Total Timed Codes (min): 45  1:1 Treatment Time ( only): 30   Visit #:  11    Treatment Area: Bicipital tendinitis, right shoulder [M75.21]  Right shoulder pain [M25.511]    SUBJECTIVE  Pain Level (0-10 scale): 1  Any medication changes, allergies to medications, adverse drug reactions, diagnosis change, or new procedure performed?: [x] No    [] Yes (see summary sheet for update)  Subjective functional status/changes:   [x] No changes reported  Patient reports she went to reach on the top shelf at the grocery store and felt a little pulling and pian along deltoid area.     OBJECTIVE    Modality rationale: decrease pain and increase tissue extensibility to improve the patients ability to lift, carry, reach and complete ADL's   Min Type Additional Details      15 [x] Estim: []Att   [x]Unatt    []TENS instruct                  [x]IFC  []Premod   []NMES                     []Other:  []w/US   []w/ice:shoulder   [x]w/heat:neck  Position: reclined  Location:neck       []  Traction: [] Cervical       []Lumbar                       [] Prone          []Supine                       []Intermittent   []Continuous Lbs:  [] before manual  [] after manual  []w/heat    []  Ultrasound: []Continuous   [] Pulsed                       at: []1MHz   []3MHz Location:  W/cm2:    [] Paraffin         Location:   []w/heat    []  Ice     []  Heat  []  Ice massage Position:  Location:    []  Laser  []  Other: Position:  Location:      []  Vasopneumatic Device Pressure:       [] lo [] med [] hi   Temperature:      [x] Skin assessment post-treatment:  [x]intact []redness- no adverse reaction    []redness - adverse reaction:     35 min Therapeutic Exercise:  [x] See flow sheet :theracane education and instruction for home use   Rationale: increase ROM, increase strength, improve coordination, improve balance and increase proprioception to improve the patients ability to lift, carry, reach and complete ADL's    10 min Manual Therapy: MFR B UT, Levator, deltoids, PROM all directions to tolerance, mobs with movement abduction and flexion    Rationale: decrease pain, increase ROM, increase tissue extensibility and decrease trigger points to improve the patients ability to lift, carry, reach and complete ADL's    With   [] TE   [] TA   [] neuro   [] other: Patient Education: [x] Review HEP    [] Progressed/Changed HEP based on:   [] positioning   [] body mechanics   [] transfers   [] heat/ice application    [] other:      Other Objective/Functional Measures: foto complete  Pt able to advance several exercises with min pain present for elli lifts    Pain Level (0-10 scale) post treatment: 0    ASSESSMENT/Changes in Function:     Patient will continue to benefit from skilled PT services to modify and progress therapeutic interventions, address functional mobility deficits, address ROM deficits, address strength deficits, analyze and address soft tissue restrictions, analyze and cue movement patterns, analyze and modify body mechanics/ergonomics and assess and modify postural abnormalities to attain remaining goals. []  See Plan of Care  []  See progress note/recertification  []  See Discharge Summary         Progress towards goals / Updated goals:  Patient making good progress towards goals with improved overall AROM and is able to perform more chores at home without pain. Short Term Goals:  To be accomplished in 8 treatments:  Pt will be able to reach overhead with 0/10 pain in order to improve ability to brush hair Met  Pt will be able to reach behind back with 0/10 pain to improve ability to put on belt Progressing  Pt will be able to lift 5lb weight to shoulder level to improve ability to perform yard work Met  Long Term Goals: To be accomplished in 16 treatments:  Pt will report being able to garden for 1 hour with 0/10 pain in shoulder Met  Pt will be able to lift 10lbs to shoulder level to improve ability to put a gallon of milk on the shelf  Pt will report being able to lift a 20lb object from the ground to waist level to improve ability to carry groceries   Frequency / Duration: Patient to be seen 2 times per week for 8 weeks.         PLAN  [x]  Upgrade activities as tolerated     [x]  Continue plan of care  []  Update interventions per flow sheet       []  Discharge due to:_  []  Other:_      Maurice Hills 7/23/2019

## 2019-07-30 ENCOUNTER — HOSPITAL ENCOUNTER (OUTPATIENT)
Dept: PHYSICAL THERAPY | Age: 64
Discharge: HOME | End: 2019-07-30
Payer: COMMERCIAL

## 2019-07-30 PROCEDURE — 97014 ELECTRIC STIMULATION THERAPY: CPT

## 2019-07-30 PROCEDURE — 97140 MANUAL THERAPY 1/> REGIONS: CPT

## 2019-07-30 PROCEDURE — 97110 THERAPEUTIC EXERCISES: CPT

## 2019-08-04 NOTE — PROGRESS NOTES
HISTORY OF PRESENT ILLNESS  Denice Murphy is a 61 y.o. female. Denice Murphy is here to follow up on their HTN. This is a chronic problem. The current episode started more than 1 week ago. The problem occurs constantly and is worse. Pertinent negatives include no chest pain, no abdominal pain, no headaches and no shortness of breath. Nothing aggravates the symptoms. The symptoms are relieved by hydrochlorothiazide, Altace, which are working moderately. Evidently, she went for a walk yesterday and has not felt right since. It feels like her heart is pumping hard and she is fatigued. Also, she has been having intermittent chest tightness, lasting about 15 minutes. No radiation. No associated N/V, diaphoresis, or near syncope. Her blood pressure was 137/73 yesterday evening. Up until then, her blood pressures have been great. She had a recent coronary calcium score of 93 with significant left main disease. Her lipid panel was normal in November. Review of Systems   Constitutional: Positive for weight loss. Negative for diaphoresis. No weight gain   Eyes: Negative for blurred vision. Respiratory: Negative for shortness of breath. Cardiovascular: Negative for chest pain, palpitations and leg swelling. Chest tightness   Gastrointestinal: Negative for abdominal pain, nausea and vomiting. Neurological: Negative for dizziness, sensory change, speech change, focal weakness and headaches. Visit Vitals  /82   Pulse (!) 59   Temp 95.9 °F (35.5 °C) (Oral)   Resp 18   Ht 5' 6\" (1.676 m)   Wt 199 lb (90.3 kg)   BMI 32.12 kg/m²     BP Readings from Last 3 Encounters:   05/17/19 116/80   02/19/19 134/83   09/07/18 138/83     Physical Exam   Constitutional: She is oriented to person, place, and time. She appears well-developed and well-nourished. No distress. Cardiovascular: Normal rate, regular rhythm and normal heart sounds. Exam reveals no gallop and no friction rub. No murmur heard. Pulmonary/Chest: Effort normal and breath sounds normal. No respiratory distress. She has no wheezes. She has no rales. Musculoskeletal: She exhibits no edema. Neurological: She is alert and oriented to person, place, and time. Skin: Skin is warm and dry. She is not diaphoretic. Nursing note and vitals reviewed. EKG - sinus bradycardia without STTW changes, normal intervals and axis, no hypertrophy     ASSESSMENT and PLAN    ICD-10-CM ICD-9-CM    1. Chest tightness R07.89 786.59 AMB POC EKG ROUTINE W/ 12 LEADS, INTER & REP      REFERRAL TO CARDIOLOGY   2. High coronary artery calcium score R93.1 414.00 aspirin delayed-release (ASPIR-81) 81 mg tablet      REFERRAL TO CARDIOLOGY   3. Essential hypertension K46 165.7 METABOLIC PANEL, BASIC   4. Acquired hypothyroidism E03.9 244.9 TSH 3RD GENERATION   5. Encounter for immunization Z23 V03.89 varicella-zoster recombinant, PF, (SHINGRIX, PF,) 50 mcg/0.5 mL susr injection        Chest tightness with CAD on cardiac CT, ? Anginal equivalent  Blood pressure elevated today  Add aspirin daily  Consider beta blocker, however her pulse is low  Cardiology referral  Labs per orders. Information given on when to call the office and when to call 911 regarding her chest symptoms    Follow-up and Dispositions    · Return in about 6 weeks (around 9/16/2019) for blood pressure. Reviewed plan of care. Patient has provided input and agrees with goals.

## 2019-08-05 ENCOUNTER — OFFICE VISIT (OUTPATIENT)
Dept: FAMILY MEDICINE CLINIC | Age: 64
End: 2019-08-05

## 2019-08-05 VITALS
RESPIRATION RATE: 18 BRPM | TEMPERATURE: 95.9 F | BODY MASS INDEX: 31.98 KG/M2 | WEIGHT: 199 LBS | SYSTOLIC BLOOD PRESSURE: 149 MMHG | DIASTOLIC BLOOD PRESSURE: 82 MMHG | HEIGHT: 66 IN | HEART RATE: 59 BPM

## 2019-08-05 DIAGNOSIS — R07.89 CHEST TIGHTNESS: Primary | ICD-10-CM

## 2019-08-05 DIAGNOSIS — Z23 ENCOUNTER FOR IMMUNIZATION: ICD-10-CM

## 2019-08-05 DIAGNOSIS — R93.1 HIGH CORONARY ARTERY CALCIUM SCORE: ICD-10-CM

## 2019-08-05 DIAGNOSIS — I10 ESSENTIAL HYPERTENSION: ICD-10-CM

## 2019-08-05 DIAGNOSIS — E03.9 ACQUIRED HYPOTHYROIDISM: ICD-10-CM

## 2019-08-05 RX ORDER — ASPIRIN 81 MG/1
81 TABLET ORAL DAILY
COMMUNITY
End: 2021-11-15

## 2019-08-05 NOTE — PROGRESS NOTES
Chief Complaint   Patient presents with    Blood Pressure Check     6 mo f/u     1. Have you been to the ER, urgent care clinic since your last visit? Hospitalized since your last visit? No     2. Have you seen or consulted any other health care providers outside of the [address] [place] [address] since your last visit? Include any pap smears or colon screening.  No

## 2019-08-05 NOTE — PATIENT INSTRUCTIONS

## 2019-08-06 ENCOUNTER — HOSPITAL ENCOUNTER (OUTPATIENT)
Dept: PHYSICAL THERAPY | Age: 64
Discharge: HOME | End: 2019-08-06
Payer: COMMERCIAL

## 2019-08-06 LAB
BUN SERPL-MCNC: 13 MG/DL (ref 8–27)
BUN/CREAT SERPL: 15 (ref 12–28)
CALCIUM SERPL-MCNC: 10.1 MG/DL (ref 8.7–10.3)
CHLORIDE SERPL-SCNC: 102 MMOL/L (ref 96–106)
CO2 SERPL-SCNC: 27 MMOL/L (ref 20–29)
CREAT SERPL-MCNC: 0.86 MG/DL (ref 0.57–1)
GLUCOSE SERPL-MCNC: 103 MG/DL (ref 65–99)
POTASSIUM SERPL-SCNC: 4.7 MMOL/L (ref 3.5–5.2)
SODIUM SERPL-SCNC: 146 MMOL/L (ref 134–144)
TSH SERPL DL<=0.005 MIU/L-ACNC: 3.09 UIU/ML (ref 0.45–4.5)

## 2019-08-06 PROCEDURE — 97014 ELECTRIC STIMULATION THERAPY: CPT

## 2019-08-06 PROCEDURE — 97110 THERAPEUTIC EXERCISES: CPT

## 2019-08-06 PROCEDURE — 97140 MANUAL THERAPY 1/> REGIONS: CPT

## 2019-08-06 NOTE — PROGRESS NOTES
PT DAILY TREATMENT NOTE - [facility] 2-15    Patient Name: Fercho Forman  Date:2019  : 1955  [x]  Patient  Verified  Payor: BLUE CROSS / Plan: Peter Lozano [id number] PPO / Product Type: PPO /    In time: 9:25a  Out time:10:35a  Total Treatment Time (min): 70  Total Timed Codes (min): 55  1:1 Treatment Time ( only): 30   Visit #:  13    Treatment Area: Bicipital tendinitis, right shoulder [M75.21]  Right shoulder pain [M25.511]    SUBJECTIVE  Pain Level (0-10 scale): 0.5  Any medication changes, allergies to medications, adverse drug reactions, diagnosis change, or new procedure performed?: [x] No    [] Yes (see summary sheet for update)  Subjective functional status/changes:   [x] No changes reported  Patient reports she is doing good as far as her shoulder goes, still having some trouble reaching into high shelf. Patient states she ha caal appointment with her Cardiologist on Thursday as some issues have shown up on her latest tests.     OBJECTIVE    Modality rationale: decrease pain and increase tissue extensibility to improve the patients ability to lift, carry, reach and complete ADL's   Min Type Additional Details      15 [x] Estim: []Att   [x]Unatt    []TENS instruct                  [x]IFC  []Premod   []NMES                     []Other:  []w/US   []w/ice:shoulder   [x]w/heat  Position: reclined  Location:neck       []  Traction: [] Cervical       []Lumbar                       [] Prone          []Supine                       []Intermittent   []Continuous Lbs:  [] before manual  [] after manual  []w/heat    []  Ultrasound: []Continuous   [] Pulsed                       at: []1MHz   []3MHz Location:  W/cm2:    [] Paraffin         Location:   []w/heat    []  Ice     []  Heat  []  Ice massage Position:  Location:    []  Laser  []  Other: Position:  Location:      []  Vasopneumatic Device Pressure:       [] lo [] med [] hi   Temperature:      [x] Skin assessment post-treatment:  [x]intact []redness- no adverse reaction    []redness - adverse reaction:     45 min Therapeutic Exercise:  [x] See flow sheet :   Rationale: increase ROM, increase strength, improve coordination, improve balance and increase proprioception to improve the patients ability to lift, carry, reach and complete ADL's    10 min Manual Therapy: MFR B UT, Levator, deltoids, PROM all directions to tolerance, mobs with movement abduction and flexion, scapular gliding, subscap    Rationale: decrease pain, increase ROM, increase tissue extensibility and decrease trigger points to improve the patients ability to lift, carry, reach and complete ADL's    With   [] TE   [] TA   [] neuro   [] other: Patient Education: [x] Review HEP    [] Progressed/Changed HEP based on:   [] positioning   [] body mechanics   [] transfers   [] heat/ice application    [] other:      Other Objective/Functional Measures: foto complete  Pt able to advance several exercises with no pain present for elli lifts, max fatigue noted    Pain Level (0-10 scale) post treatment: 0    ASSESSMENT/Changes in Function:     Patient will continue to benefit from skilled PT services to modify and progress therapeutic interventions, address functional mobility deficits, address ROM deficits, address strength deficits, analyze and address soft tissue restrictions, analyze and cue movement patterns, analyze and modify body mechanics/ergonomics and assess and modify postural abnormalities to attain remaining goals. []  See Plan of Care  []  See progress note/recertification  []  See Discharge Summary         Progress towards goals / Updated goals:  Patient with min pain and tightness end range ER>Abduction>Flexion. Patient making good progress towards goals at this time and is demonstrating improved tolerance for overhead lifting/reaching. Short Term Goals:  To be accomplished in 8 treatments:  Pt will be able to reach overhead with 0/10 pain in order to improve ability to brush hair Met  Pt will be able to reach behind back with 0/10 pain to improve ability to put on belt Met  Pt will be able to lift 5lb weight to shoulder level to improve ability to perform yard work Met  Long Term Goals: To be accomplished in 16 treatments:  Pt will report being able to garden for 1 hour with 0/10 pain in shoulder Met  Pt will be able to lift 10lbs to shoulder level to improve ability to put a gallon of milk on the shelf  Pt will report being able to lift a 20lb object from the ground to waist level to improve ability to carry groceries   Frequency / Duration: Patient to be seen 2 times per week for 8 weeks.         PLAN  [x]  Upgrade activities as tolerated     [x]  Continue plan of care  []  Update interventions per flow sheet       []  Discharge due to:_  []  Other:_      Maira Rodriguez 8/6/2019

## 2019-08-08 ENCOUNTER — OFFICE VISIT (OUTPATIENT)
Dept: CARDIOLOGY CLINIC | Age: 64
End: 2019-08-08

## 2019-08-08 VITALS
SYSTOLIC BLOOD PRESSURE: 140 MMHG | WEIGHT: 202.4 LBS | RESPIRATION RATE: 16 BRPM | HEART RATE: 66 BPM | HEIGHT: 66 IN | OXYGEN SATURATION: 98 % | DIASTOLIC BLOOD PRESSURE: 78 MMHG | BODY MASS INDEX: 32.53 KG/M2

## 2019-08-08 DIAGNOSIS — R93.1 HIGH CORONARY ARTERY CALCIUM SCORE: ICD-10-CM

## 2019-08-08 DIAGNOSIS — I25.119 CORONARY ARTERY DISEASE INVOLVING NATIVE HEART WITH ANGINA PECTORIS, UNSPECIFIED VESSEL OR LESION TYPE (HCC): ICD-10-CM

## 2019-08-08 DIAGNOSIS — I10 ESSENTIAL HYPERTENSION: Primary | ICD-10-CM

## 2019-08-08 RX ORDER — BISMUTH SUBSALICYLATE 262 MG
1 TABLET,CHEWABLE ORAL DAILY
COMMUNITY

## 2019-08-08 RX ORDER — AA/PROT/LYSINE/METHIO/VIT C/B6 50-12.5 MG
TABLET ORAL DAILY
COMMUNITY
End: 2019-09-16

## 2019-08-08 NOTE — PROGRESS NOTES
Reason for Consult: Elevated CAC. Referring: Colonel Rocael MD    HPI: Mack Wang is a 61 y.o. female with past medical history significant for hypertension, dyslipidemia, hypothyroidism, cavernous hemangioma of the brain is here for further evaluation of a recently resulted positive coronary artery calcium score. She had a screening coronary artery calcium score performed after seeing an advertisement for the screening. The calcium score was performed on May 22, 2019 which I was able to personally review. The score reported as a total calcium score of 93 with a concentration of 73 in the left anterior descending, left circumflex 14 and right coronary artery 2. This was resulted based on her age-matched control of 75th percentile rank. As such she does not have any cardiac symptoms including chest pain, shortness of breath, lightheadedness or dizziness. She has been very active until July and was walking at least 4 miles per day. She has been taking her medications. No significant family history. No prior cardiac history. She had a stress test performed nearly 10 years ago which was normal.  She was known to have cardiac murmur growing up however more recently this has not been major issue. EKG in our office today demonstrated normal sinus rhythm, normal axis, normal ST segment, normal intervals. Plan:    1. Positive coronary artery calcium score: Low-level coronary artery calcium score of 93 with majority distribution in the left anterior coronary artery. We went over the natural history of calcium score. She is already taking statins and her blood pressure is reasonably well controlled. Will continue with diet and exercise which she should continue. At this time I will proceed with an NMR lipid profile to see if she has significantly elevated small particle size and if we need to change her statins.   Of note she has been on Lipitor in the past and could not tolerate it and has significant muscle aches and pains. She is able to continue Pravachol along with co-Q10. She had already started aspirin 81 mg p.o. daily which we should continue. We must repeat a coronary artery calcium score again in 3 years. 2.  Dyslipidemia: Last fasting lipid profile is from November 2018. LDL reported at that time was 89, HDL 63 and total cholesterol of 170. We will be repeating a fasting lipid profile along with NMR lipids. Further adjustments based on the results. 3.  Hypertension: Blood pressure had been reasonably well controlled although most recently has been slightly elevated. She will work on diet and resuming her activities. Continue to follow on blood pressure readings. Continue HCTZ 12.5 mg p.o. daily along with Altace 2.5 mg p.o. Daily. 4. See Dr. Gracie Bernard in 1 month.        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    Hypercholesteremia     Hypertension     Hypothyroidism     Lumbar disc disease     Pulmonary nodule     stable, last CT around 2014            Past Surgical History:   Procedure Laterality Date    HX CHOLECYSTECTOMY N/A 2009    HX ORTHOPAEDIC Right     clot removed from hand, age 5             Family History   Problem Relation Age of Onset    Cancer Father         lung    Thyroid Disease Mother     Dementia Mother     Hypertension Mother     No Known Problems Brother            Social History     Socioeconomic History    Marital status:      Spouse name: Not on file    Number of children: Not on file    Years of education: Not on file    Highest education level: Not on file   Occupational History    Not on file   Social Needs    Financial resource strain: Not on file    Food insecurity:     Worry: Not on file Inability: Not on file    Transportation needs:     Medical: Not on file     Non-medical: Not on file   Tobacco Use    Smoking status: Former Smoker     Packs/day: 1.00     Last attempt to quit: 2007     Years since quittin.5    Smokeless tobacco: Never Used   Substance and Sexual Activity    Alcohol use: Yes     Comment: rarely    Drug use: No    Sexual activity: Not Currently   Lifestyle    Physical activity:     Days per week: Not on file     Minutes per session: Not on file    Stress: Not on file   Relationships    Social connections:     Talks on phone: Not on file     Gets together: Not on file     Attends Congregational service: Not on file     Active member of club or organization: Not on file     Attends meetings of clubs or organizations: Not on file     Relationship status: Not on file    Intimate partner violence:     Fear of current or ex partner: Not on file     Emotionally abused: Not on file     Physically abused: Not on file     Forced sexual activity: Not on file   Other Topics Concern    Not on file   Social History Narrative    Not on file         Allergies   Allergen Reactions    Sulfa (Sulfonamide Antibiotics) Seizures            Current Outpatient Medications   Medication Sig Dispense Refill    coenzyme q10 (CO Q-10) 10 mg cap Take  by mouth.  multivitamin (ONE A DAY) tablet Take 1 Tab by mouth daily.  aspirin delayed-release (ASPIR-81) 81 mg tablet Take 1 Tab by mouth daily.  levothyroxine (SYNTHROID) 88 mcg tablet TAKE 1 TABLET ON AN EMPTY STOMACH, IN THE MORNING EVERY OTHER DAY ALTERNATING WITH 75 MCG 15 Tab 3    ibuprofen (MOTRIN) 600 mg tablet Take 1 Tab by mouth every six (6) hours as needed for Pain.  40 Tab 0    pravastatin (PRAVACHOL) 20 mg tablet TAKE 1 TABLET BY MOUTH EVERY DAY 90 Tab 2    hydroCHLOROthiazide (MICROZIDE) 12.5 mg capsule TAKE 1 CAPSULE BY MOUTH EVERY DAY 90 Cap 1    ramipril (ALTACE) 2.5 mg capsule TAKE 1 CAPSULE BY MOUTH ONCE A DAY FOR 90 DAYS 90 Cap 1    levothyroxine (SYNTHROID) 75 mcg tablet TAKE 1 TABLET BY MOUTH EVERY DAY 30 Tab 9    albuterol (PROAIR HFA) 90 mcg/actuation inhaler Take  by inhalation. ROS:  12 point review of systems was performed. All negative except for HPI     Physical Exam:  Visit Vitals  /78 (BP 1 Location: Right arm, BP Patient Position: Sitting)   Pulse 66   Resp 16   Ht 5' 6\" (1.676 m)   Wt 202 lb 6.4 oz (91.8 kg)   SpO2 98%   BMI 32.67 kg/m²       Gen:  Well-developed, well-nourished, in no acute distress  HEENT:  Pink conjunctivae, PERRL, hearing intact to voice, moist mucous membranes  Neck:  Supple, without masses, thyroid non-tender  Resp:  No accessory muscle use, clear breath sounds without wheezes rales or rhonchi  Card:  No murmurs, normal S1, S2 without thrills, bruits or peripheral edema  Abd:  Soft, non-tender, non-distended, normoactive bowel sounds are present, no palpable organomegaly and no detectable hernias  Lymph:  No cervical or inguinal adenopathy  Musc:  No cyanosis or clubbing  Skin:  No rashes or ulcers, skin turgor is good  Neuro:  Cranial nerves are grossly intact, no focal motor weakness, follows commands appropriately  Psych:  Good insight, oriented to person, place and time, alert     Labs:     No results found for: WBC, WBCT, WBCPOC, HGB, HGBPOC, HCT, HCTPOC, PLT, PLTPOC, MCV, MCVPOC, HGBEXT, HCTEXT, PLTEXT  Lab Results   Component Value Date/Time    Glucose 103 (H) 08/05/2019 11:37 AM    LDL, calculated 89 11/29/2018 08:19 AM    Creatinine 0.86 08/05/2019 11:37 AM      Lab Results   Component Value Date/Time    Cholesterol, total 170 11/29/2018 08:19 AM    HDL Cholesterol 63 11/29/2018 08:19 AM    LDL, calculated 89 11/29/2018 08:19 AM    Triglyceride 91 11/29/2018 08:19 AM     Lab Results   Component Value Date/Time    ALT (SGPT) 20 11/29/2018 08:19 AM    AST (SGOT) 20 11/29/2018 08:19 AM    Alk.  phosphatase 66 11/29/2018 08:19 AM    Bilirubin, direct 0.19 11/29/2018 08:19 AM    Bilirubin, total 0.8 11/29/2018 08:19 AM    Albumin 4.6 11/29/2018 08:19 AM    Protein, total 6.9 11/29/2018 08:19 AM     No results found for: INR, PTMR, PTP, PT1, PT2   Lab Results   Component Value Date/Time    GFR est non-AA 72 08/05/2019 11:37 AM    GFR est AA 83 08/05/2019 11:37 AM    Creatinine 0.86 08/05/2019 11:37 AM    BUN 13 08/05/2019 11:37 AM    Sodium 146 (H) 08/05/2019 11:37 AM    Potassium 4.7 08/05/2019 11:37 AM    Chloride 102 08/05/2019 11:37 AM    CO2 27 08/05/2019 11:37 AM     No results found for: PSA, PSA2, PSAR1, Rohith Sand, PSAR3, [id number], [id number], PSALT  Lab Results   Component Value Date/Time    TSH 3.090 08/05/2019 11:37 AM      Lab Results   Component Value Date/Time    Glucose 103 (H) 08/05/2019 11:37 AM      No results found for: CPK, RCK1, RCK2, RCK3, RCK4, CKMB, CKNDX, CKND1, TROPT, TROIQ, BNPP, BNP   No results found for: BNP, BNPP, BNPPPOC, XBNPT, BNPNT   Lab Results   Component Value Date/Time    Sodium 146 (H) 08/05/2019 11:37 AM    Potassium 4.7 08/05/2019 11:37 AM    Chloride 102 08/05/2019 11:37 AM    CO2 27 08/05/2019 11:37 AM    Glucose 103 (H) 08/05/2019 11:37 AM    BUN 13 08/05/2019 11:37 AM    Creatinine 0.86 08/05/2019 11:37 AM    BUN/Creatinine ratio 15 08/05/2019 11:37 AM    GFR est AA 83 08/05/2019 11:37 AM    GFR est non-AA 72 08/05/2019 11:37 AM    Calcium 10.1 08/05/2019 11:37 AM      Lab Results   Component Value Date/Time    Sodium 146 (H) 08/05/2019 11:37 AM    Potassium 4.7 08/05/2019 11:37 AM    Chloride 102 08/05/2019 11:37 AM    CO2 27 08/05/2019 11:37 AM    Glucose 103 (H) 08/05/2019 11:37 AM    BUN 13 08/05/2019 11:37 AM    Creatinine 0.86 08/05/2019 11:37 AM    BUN/Creatinine ratio 15 08/05/2019 11:37 AM    GFR est AA 83 08/05/2019 11:37 AM    GFR est non-AA 72 08/05/2019 11:37 AM    Calcium 10.1 08/05/2019 11:37 AM    Bilirubin, total 0.8 11/29/2018 08:19 AM    ALT (SGPT) 20 11/29/2018 08:19 AM    AST (SGOT) 20 11/29/2018 08:19 AM    Alk. phosphatase 66 11/29/2018 08:19 AM    Protein, total 6.9 11/29/2018 08:19 AM    Albumin 4.6 11/29/2018 08:19 AM      No results found for: HBA1C, AJD4HXTK, HGBE8, UIK6DIHD, YWX6CSTI      No results for input(s): CPK, CKMB, TROIQ in the last 72 hours. No lab exists for component: CKQMB, CPKMB        Problem List:     Problem List  Date Reviewed: 8/8/2019          Codes Class Noted    High coronary artery calcium score ICD-10-CM: R93.1  ICD-9-CM: 414.00  8/5/2019        GERD (gastroesophageal reflux disease) ICD-10-CM: K21.9  ICD-9-CM: 530.81  Unknown        Hypothyroidism ICD-10-CM: E03.9  ICD-9-CM: 244.9  Unknown        Hypercholesteremia ICD-10-CM: E78.00  ICD-9-CM: 272.0  Unknown        Lumbar disc disease ICD-10-CM: M51.9  ICD-9-CM: 722.93  Unknown        [facility] ICD-10-CM: D18.02  ICD-9-CM: 228.02  Unknown        Pulmonary nodule ICD-10-CM: R91.1  ICD-9-CM: 793.11  Unknown    Overview Signed 3/8/2018 10:42 AM by Gasper Guzman MD     stable, last CT around 2014             History of seizure ICD-10-CM: Z87.898  ICD-9-CM: V12.49  Unknown    Overview Signed 3/8/2018 10:48 AM by Gasper Guzman MD     once, due to 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

## 2019-08-08 NOTE — PROGRESS NOTES
Kylie Garcia is a 61 y.o. female    Chief Complaint   Patient presents with    New Patient     Ref by Dr. Odom Prom Fatigue    Chest Pain (Angina)       Visit Vitals  /78 (BP 1 Location: Right arm, BP Patient Position: Sitting)   Pulse 66   Resp 16   Ht 5' 6\" (1.676 m)   Wt 202 lb 6.4 oz (91.8 kg)   SpO2 98%   BMI 32.67 kg/m²       1. Have you been to the ER, urgent care clinic since your last visit? Hospitalized since your last visit? No    2. Have you seen or consulted any other health care providers outside of the [address] [place] [address] since your last visit? Include any pap smears or colon screening.  No

## 2019-08-13 ENCOUNTER — HOSPITAL ENCOUNTER (OUTPATIENT)
Dept: PHYSICAL THERAPY | Age: 64
Discharge: HOME | End: 2019-08-13
Payer: COMMERCIAL

## 2019-08-13 PROCEDURE — 97110 THERAPEUTIC EXERCISES: CPT

## 2019-08-13 PROCEDURE — 97140 MANUAL THERAPY 1/> REGIONS: CPT

## 2019-08-13 PROCEDURE — 97014 ELECTRIC STIMULATION THERAPY: CPT

## 2019-08-13 NOTE — PROGRESS NOTES
PT DAILY TREATMENT NOTE - [facility] 2-15    Patient Name: Mendoza Shetty  Date:2019  : 1955  [x]  Patient  Verified  Payor: BLUE CROSS / Plan: Peter Lozano [id number] PPO / Product Type: PPO /    In time:12:00p Out time:1:10p  Total Treatment Time (min): 70  Total Timed Codes (min): 55  1:1 Treatment Time (MC only): 40   Visit #:  14    Treatment Area: Bicipital tendinitis, right shoulder [M75.21]  Right shoulder pain [M25.511]    SUBJECTIVE  Pain Level (0-10 scale): 0.5  Any medication changes, allergies to medications, adverse drug reactions, diagnosis change, or new procedure performed?: [x] No    [] Yes (see summary sheet for update)  Subjective functional status/changes:   [x] No changes reported  Patient reports increased soreness and fatigue after last visit, but felt better the next day. Patient states she continues to have increased pain with reaching into high shelf and reaching behind her back.     OBJECTIVE    Modality rationale: decrease pain and increase tissue extensibility to improve the patients ability to lift, carry, reach and complete ADL's   Min Type Additional Details      15 [x] Estim: []Att   [x]Unatt    []TENS instruct                  [x]IFC  []Premod   []NMES                     []Other:  []w/US   []w/ice:shoulder   [x]w/heat  Position: reclined  Location:neck       []  Traction: [] Cervical       []Lumbar                       [] Prone          []Supine                       []Intermittent   []Continuous Lbs:  [] before manual  [] after manual  []w/heat    []  Ultrasound: []Continuous   [] Pulsed                       at: []1MHz   []3MHz Location:  W/cm2:    [] Paraffin         Location:   []w/heat    []  Ice     []  Heat  []  Ice massage Position:  Location:    []  Laser  []  Other: Position:  Location:      []  Vasopneumatic Device Pressure:       [] lo [] med [] hi   Temperature:      [x] Skin assessment post-treatment:  [x]intact []redness- no adverse reaction []redness - adverse reaction:     45 min Therapeutic Exercise:  [x] See flow sheet :   Rationale: increase ROM, increase strength, improve coordination, improve balance and increase proprioception to improve the patients ability to lift, carry, reach and complete ADL's    10 min Manual Therapy: MFR B UT, Levator, deltoids, shoulder stabilizers, PROM all directions to tolerance, mobs with movement abduction and flexion, scapular gliding, subscap    Rationale: decrease pain, increase ROM, increase tissue extensibility and decrease trigger points to improve the patients ability to lift, carry, reach and complete ADL's    With   [] TE   [] TA   [] neuro   [] other: Patient Education: [x] Review HEP    [] Progressed/Changed HEP based on:   [] positioning   [] body mechanics   [] transfers   [] heat/ice application    [] other:      Other Objective/Functional Measures: foto complete  HEP updated     Pain Level (0-10 scale) post treatment: 0    ASSESSMENT/Changes in Function:     Patient will continue to benefit from skilled PT services to modify and progress therapeutic interventions, address functional mobility deficits, address ROM deficits, address strength deficits, analyze and address soft tissue restrictions, analyze and cue movement patterns, analyze and modify body mechanics/ergonomics and assess and modify postural abnormalities to attain remaining goals. []  See Plan of Care  []  See progress note/recertification  []  See Discharge Summary         Progress towards goals / Updated goals:  Patient making good progress towards goals with continued pain with reaching back into high shelf and reaching behind back. Short Term Goals:  To be accomplished in 8 treatments:  Pt will be able to reach overhead with 0/10 pain in order to improve ability to brush hair Met  Pt will be able to reach behind back with 0/10 pain to improve ability to put on belt Met  Pt will be able to lift 5lb weight to shoulder level to improve ability to perform yard work Met  Long Term Goals: To be accomplished in 16 treatments:  Pt will report being able to garden for 1 hour with 0/10 pain in shoulder Met  Pt will be able to lift 10lbs to shoulder level to improve ability to put a gallon of milk on the shelf  Pt will report being able to lift a 20lb object from the ground to waist level to improve ability to carry groceries   Frequency / Duration: Patient to be seen 2 times per week for 8 weeks.         PLAN  [x]  Upgrade activities as tolerated     [x]  Continue plan of care  []  Update interventions per flow sheet       []  Discharge due to:_  []  Other:_      Opal Poon 8/13/2019

## 2019-08-14 LAB
CHOLEST SERPL-MCNC: 169 MG/DL (ref 100–199)
CHOLEST SERPL-MCNC: 180 MG/DL (ref 100–199)
HDL SERPL-SCNC: 42.9 UMOL/L
HDLC SERPL-MCNC: 61 MG/DL
HDLC SERPL-MCNC: 63 MG/DL
INTERPRETATION, 910389: NORMAL
LDL SERPL QN: 20.6 NM
LDL SERPL-SCNC: 1096 NMOL/L
LDL SMALL SERPL-SCNC: 429 NMOL/L
LDLC SERPL CALC-MCNC: 87 MG/DL (ref 0–99)
LDLC SERPL CALC-MCNC: 95 MG/DL (ref 0–99)
LP-IR SCORE SERPL: 35
TRIGL SERPL-MCNC: 103 MG/DL (ref 0–149)
TRIGL SERPL-MCNC: 111 MG/DL (ref 0–149)
VLDLC SERPL CALC-MCNC: 21 MG/DL (ref 5–40)

## 2019-08-15 NOTE — PROGRESS NOTES
Pls notify>> The LDLp is minimally elevated. Recommend current meds. Advise Zetia 10mg po daily along with current meds.    Repeat NMR in 4 months if she is going to start Zetia

## 2019-08-20 ENCOUNTER — HOSPITAL ENCOUNTER (OUTPATIENT)
Dept: PHYSICAL THERAPY | Age: 64
Discharge: HOME | End: 2019-08-20
Payer: COMMERCIAL

## 2019-08-20 ENCOUNTER — DOCUMENTATION ONLY (OUTPATIENT)
Dept: CARDIOLOGY CLINIC | Age: 64
End: 2019-08-20

## 2019-08-20 PROCEDURE — 97140 MANUAL THERAPY 1/> REGIONS: CPT

## 2019-08-20 PROCEDURE — 97014 ELECTRIC STIMULATION THERAPY: CPT

## 2019-08-20 PROCEDURE — 97110 THERAPEUTIC EXERCISES: CPT

## 2019-08-20 NOTE — PROGRESS NOTES
PT DAILY TREATMENT NOTE - [facility] 2-15    Patient Name: Sarah Schultz  Date:2019  : 1955  [x]  Patient  Verified  Payor: BLUE CROSS / Plan: Peter Lozano [id number] PPO / Product Type: PPO /    In time:2:30p Out time:3:40p  Total Treatment Time (min): 70  Total Timed Codes (min): 55  1:1 Treatment Time ( only): 30   Visit #:  15    Treatment Area: Bicipital tendinitis, right shoulder [M75.21]  Right shoulder pain [M25.511]    SUBJECTIVE  Pain Level (0-10 scale): 0.5  Any medication changes, allergies to medications, adverse drug reactions, diagnosis change, or new procedure performed?: [x] No    [] Yes (see summary sheet for update)  Subjective functional status/changes:   [x] No changes reported  Patient reports she no longer has pain reaching behind her back, but just tightness. Patient states she continues to have issues with reaching overhead and grabbing objects, but does not have pain with just lifting arm.     OBJECTIVE    Modality rationale: decrease pain and increase tissue extensibility to improve the patients ability to lift, carry, reach and complete ADL's   Min Type Additional Details      15 [x] Estim: []Att   [x]Unatt    []TENS instruct                  [x]IFC  []Premod   []NMES                     []Other:  []w/US   []w/ice:shoulder   [x]w/heat  Position: reclined  Location:neck       []  Traction: [] Cervical       []Lumbar                       [] Prone          []Supine                       []Intermittent   []Continuous Lbs:  [] before manual  [] after manual  []w/heat    []  Ultrasound: []Continuous   [] Pulsed                       at: []1MHz   []3MHz Location:  W/cm2:    [] Paraffin         Location:   []w/heat    []  Ice     []  Heat  []  Ice massage Position:  Location:    []  Laser  []  Other: Position:  Location:      []  Vasopneumatic Device Pressure:       [] lo [] med [] hi   Temperature:      [x] Skin assessment post-treatment:  [x]intact []redness- no adverse reaction    []redness - adverse reaction:     45 min Therapeutic Exercise:  [x] See flow sheet :   Rationale: increase ROM, increase strength, improve coordination, improve balance and increase proprioception to improve the patients ability to lift, carry, reach and complete ADL's    10 min Manual Therapy: PROM all directions to tolerance, mobs with movement abduction and flexion, scapular gliding, subscap    Rationale: decrease pain, increase ROM, increase tissue extensibility and decrease trigger points to improve the patients ability to lift, carry, reach and complete ADL's    With   [] TE   [] TA   [] neuro   [] other: Patient Education: [x] Review HEP    [] Progressed/Changed HEP based on:   [] positioning   [] body mechanics   [] transfers   [] heat/ice application    [] other:      Other Objective/Functional Measures:       Pain Level (0-10 scale) post treatment: 0    ASSESSMENT/Changes in Function:     Patient will continue to benefit from skilled PT services to modify and progress therapeutic interventions, address functional mobility deficits, address ROM deficits, address strength deficits, analyze and address soft tissue restrictions, analyze and cue movement patterns, analyze and modify body mechanics/ergonomics and assess and modify postural abnormalities to attain remaining goals. []  See Plan of Care  []  See progress note/recertification  []  See Discharge Summary         Progress towards goals / Updated goals:  Patient with increased fatigue with advanced exercises. Short Term Goals: To be accomplished in 8 treatments:  Pt will be able to reach overhead with 0/10 pain in order to improve ability to brush hair Met  Pt will be able to reach behind back with 0/10 pain to improve ability to put on belt Met  Pt will be able to lift 5lb weight to shoulder level to improve ability to perform yard work Met  Long Term Goals:  To be accomplished in 16 treatments:  Pt will report being able to garden for 1 hour with 0/10 pain in shoulder Met  Pt will be able to lift 10lbs to shoulder level to improve ability to put a gallon of milk on the shelf Met  Pt will report being able to lift a 20lb object from the ground to waist level to improve ability to carry groceries   Frequency / Duration: Patient to be seen 2 times per week for 8 weeks.         PLAN  [x]  Upgrade activities as tolerated     [x]  Continue plan of care  []  Update interventions per flow sheet       []  Discharge due to:_  []  Other:_      Sundar Del Toro 8/20/2019

## 2019-08-21 NOTE — PROGRESS NOTES
Patient has been approved for Zetia 10 mg daily. PA Case: [id number], Status: Approved, Coverage Starts on: 8/21/2019 12:00:00 AM, Coverage Ends on: 8/20/2020 12:00:00 AM.    Patient notified through [address]

## 2019-08-27 ENCOUNTER — HOSPITAL ENCOUNTER (OUTPATIENT)
Dept: PHYSICAL THERAPY | Age: 64
Discharge: HOME | End: 2019-08-27
Payer: COMMERCIAL

## 2019-08-27 PROCEDURE — 97110 THERAPEUTIC EXERCISES: CPT

## 2019-08-27 PROCEDURE — 97140 MANUAL THERAPY 1/> REGIONS: CPT

## 2019-08-27 PROCEDURE — 97014 ELECTRIC STIMULATION THERAPY: CPT

## 2019-08-27 RX ORDER — EZETIMIBE 10 MG/1
10 TABLET ORAL DAILY
Qty: 30 TAB | Refills: 0 | Status: SHIPPED | OUTPATIENT
Start: 2019-08-27 | End: 2019-09-16 | Stop reason: SDUPTHER

## 2019-08-27 NOTE — PROGRESS NOTES
PT DAILY TREATMENT NOTE - [facility] 2-15    Patient Name: Viri Becerra  Date:2019  : 1955  [x]  Patient  Verified  Payor: Carlos Rocha / Plan: Peter Lozano [id number] PPO / Product Type: PPO /    In time: 11:00a Out time: 12:00p  Total Treatment Time (min): 60  Total Timed Codes (min): 45  1:1 Treatment Time ( only): 40   Visit #:  16    Treatment Area: Bicipital tendinitis, right shoulder [M75.21]  Right shoulder pain [M25.511]    SUBJECTIVE  Pain Level (0-10 scale): 0.5  Any medication changes, allergies to medications, adverse drug reactions, diagnosis change, or new procedure performed?: [x] No    [] Yes (see summary sheet for update)  Subjective functional status/changes:   [x] No changes reported  Patient reports she continues to have some pain with grabbing things overhead. Patient states she has a MD follow up scheduled for September.      OBJECTIVE    Modality rationale: decrease pain and increase tissue extensibility to improve the patients ability to lift, carry, reach and complete ADL's   Min Type Additional Details      15 [x] Estim: []Att   [x]Unatt    []TENS instruct                  [x]IFC  []Premod   []NMES                     []Other:  []w/US   []w/ice:shoulder   [x]w/heat  Position: reclined  Location:R shoulder       []  Traction: [] Cervical       []Lumbar                       [] Prone          []Supine                       []Intermittent   []Continuous Lbs:  [] before manual  [] after manual  []w/heat    []  Ultrasound: []Continuous   [] Pulsed                       at: []1MHz   []3MHz Location:  W/cm2:    [] Paraffin         Location:   []w/heat    []  Ice     []  Heat  []  Ice massage Position:  Location:    []  Laser  []  Other: Position:  Location:      []  Vasopneumatic Device Pressure:       [] lo [] med [] hi   Temperature:      [x] Skin assessment post-treatment:  [x]intact []redness- no adverse reaction    []redness - adverse reaction:     30 min Therapeutic Exercise: [x] See flow sheet :   Rationale: increase ROM, increase strength, improve coordination, improve balance and increase proprioception to improve the patients ability to lift, carry, reach and complete ADL's    10 min Manual Therapy: PROM all directions to tolerance, mobs with movement abduction and flexion, MFR deltoids   Rationale: decrease pain, increase ROM, increase tissue extensibility and decrease trigger points to improve the patients ability to lift, carry, reach and complete ADL's    With   [] TE   [] TA   [] neuro   [] other: Patient Education: [x] Review HEP    [] Progressed/Changed HEP based on:   [] positioning   [] body mechanics   [] transfers   [] heat/ice application    [] other:      Other Objective/Functional Measures: HEP updated to include advanced functional exercises      Pain Level (0-10 scale) post treatment: 0    ASSESSMENT/Changes in Function:     Patient will continue to benefit from skilled PT services to modify and progress therapeutic interventions, address functional mobility deficits, address ROM deficits, address strength deficits, analyze and address soft tissue restrictions, analyze and cue movement patterns, analyze and modify body mechanics/ergonomics and assess and modify postural abnormalities to attain remaining goals. []  See Plan of Care  []  See progress note/recertification  []  See Discharge Summary         Progress towards goals / Updated goals:  Patient continues to demonstrate increased difficulty with several activities at home, however is able to manage with HEP and has a better understanding of when to stop activities to prevent increased pain. Patient to schedule two more weeks until MD appointment. Short Term Goals:  To be accomplished in 8 treatments:  Pt will be able to reach overhead with 0/10 pain in order to improve ability to brush hair Met  Pt will be able to reach behind back with 0/10 pain to improve ability to put on belt Met  Pt will be able to lift 5lb weight to shoulder level to improve ability to perform yard work Met  Long Term Goals: To be accomplished in 16 treatments:  Pt will report being able to garden for 1 hour with 0/10 pain in shoulder Met  Pt will be able to lift 10lbs to shoulder level to improve ability to put a gallon of milk on the shelf Met  Pt will report being able to lift a 20lb object from the ground to waist level to improve ability to carry groceries   Frequency / Duration: Patient to be seen 2 times per week for 8 weeks.         PLAN  [x]  Upgrade activities as tolerated     [x]  Continue plan of care  []  Update interventions per flow sheet       []  Discharge due to:_  []  Other:_      [employer] 8/27/2019

## 2019-08-27 NOTE — TELEPHONE ENCOUNTER
Medication changes made per VO of Dr. Alonzo Morrissey. Patient to start Zetia 10 mg daily. Sent to [facility] to use [facility] until we find out cost of medication through insurance.

## 2019-09-05 ENCOUNTER — HOSPITAL ENCOUNTER (OUTPATIENT)
Dept: PHYSICAL THERAPY | Age: 64
Discharge: HOME | End: 2019-09-05
Payer: COMMERCIAL

## 2019-09-05 ENCOUNTER — OFFICE VISIT (OUTPATIENT)
Dept: CARDIOLOGY CLINIC | Age: 64
End: 2019-09-05

## 2019-09-05 VITALS
SYSTOLIC BLOOD PRESSURE: 140 MMHG | RESPIRATION RATE: 16 BRPM | DIASTOLIC BLOOD PRESSURE: 86 MMHG | WEIGHT: 204.6 LBS | BODY MASS INDEX: 32.88 KG/M2 | OXYGEN SATURATION: 97 % | HEIGHT: 66 IN | HEART RATE: 64 BPM

## 2019-09-05 DIAGNOSIS — I10 ESSENTIAL HYPERTENSION: Primary | ICD-10-CM

## 2019-09-05 DIAGNOSIS — R93.1 HIGH CORONARY ARTERY CALCIUM SCORE: ICD-10-CM

## 2019-09-05 DIAGNOSIS — I25.119 CORONARY ARTERY DISEASE INVOLVING NATIVE HEART WITH ANGINA PECTORIS, UNSPECIFIED VESSEL OR LESION TYPE (HCC): ICD-10-CM

## 2019-09-05 PROCEDURE — 97110 THERAPEUTIC EXERCISES: CPT

## 2019-09-05 PROCEDURE — 97014 ELECTRIC STIMULATION THERAPY: CPT

## 2019-09-05 PROCEDURE — 97140 MANUAL THERAPY 1/> REGIONS: CPT

## 2019-09-05 RX ORDER — HYDROCHLOROTHIAZIDE 12.5 MG/1
25 CAPSULE ORAL DAILY
Qty: 180 CAP | Refills: 0 | Status: SHIPPED | OUTPATIENT
Start: 2019-09-05 | End: 2019-09-16

## 2019-09-05 NOTE — PROGRESS NOTES
PT DAILY TREATMENT NOTE - [facility] 2-15    Patient Name: Eris Bonner  Date:2019  : 1955  [x]  Patient  Verified  Payor: BLUE STEPHEN / Plan: Peter Lozano [id number] PPO / Product Type: PPO /    In time: 12:00p Out time: 12:55p  Total Treatment Time (min): 55  Total Timed Codes (min): 40  1:1 Treatment Time ( only): 40   Visit #:  17    Treatment Area: Bicipital tendinitis, right shoulder [M75.21]  Right shoulder pain [M25.511]    SUBJECTIVE  Pain Level (0-10 scale): 0.5  Any medication changes, allergies to medications, adverse drug reactions, diagnosis change, or new procedure performed?: [x] No    [] Yes (see summary sheet for update)  Subjective functional status/changes:   [x] No changes reported  Patient reports she got the shingles shot after last session and has not been feeling well since. Patient states she is starting to feel a little better, but has not been able to do much of her HEP.      OBJECTIVE    Modality rationale: decrease pain and increase tissue extensibility to improve the patients ability to lift, carry, reach and complete ADL's   Min Type Additional Details      15 [x] Estim: []Att   [x]Unatt    []TENS instruct                  [x]IFC  []Premod   []NMES                     []Other:  []w/US   []w/ice:shoulder   [x]w/heat  Position: reclined  Location:R shoulder       []  Traction: [] Cervical       []Lumbar                       [] Prone          []Supine                       []Intermittent   []Continuous Lbs:  [] before manual  [] after manual  []w/heat    []  Ultrasound: []Continuous   [] Pulsed                       at: []1MHz   []3MHz Location:  W/cm2:    [] Paraffin         Location:   []w/heat    []  Ice     []  Heat  []  Ice massage Position:  Location:    []  Laser  []  Other: Position:  Location:      []  Vasopneumatic Device Pressure:       [] lo [] med [] hi   Temperature:      [x] Skin assessment post-treatment:  [x]intact []redness- no adverse reaction []redness - adverse reaction:     30 min Therapeutic Exercise:  [x] See flow sheet :   Rationale: increase ROM, increase strength, improve coordination, improve balance and increase proprioception to improve the patients ability to lift, carry, reach and complete ADL's    10 min Manual Therapy: PROM all directions to tolerance, MFR deltoids, pecs   Rationale: decrease pain, increase ROM, increase tissue extensibility and decrease trigger points to improve the patients ability to lift, carry, reach and complete ADL's    With   [] TE   [] TA   [] neuro   [] other: Patient Education: [x] Review HEP    [] Progressed/Changed HEP based on:   [] positioning   [] body mechanics   [] transfers   [] heat/ice application    [] other:      Other Objective/Functional Measures: PROM WFL, no pain/tightness reported      Pain Level (0-10 scale) post treatment: 0    ASSESSMENT/Changes in Function:     Patient will continue to benefit from skilled PT services to modify and progress therapeutic interventions, address functional mobility deficits, address ROM deficits, address strength deficits, analyze and address soft tissue restrictions, analyze and cue movement patterns, analyze and modify body mechanics/ergonomics and assess and modify postural abnormalities to attain remaining goals. []  See Plan of Care  []  See progress note/recertification  []  See Discharge Summary         Progress towards goals / Updated goals:  Patient did well with interventions despite increased fatigue today. Will go over goals and measurements next visit to prepare for discharge to [facility]. Short Term Goals: To be accomplished in 8 treatments:  Pt will be able to reach overhead with 0/10 pain in order to improve ability to brush hair Met  Pt will be able to reach behind back with 0/10 pain to improve ability to put on belt Met  Pt will be able to lift 5lb weight to shoulder level to improve ability to perform yard work Met  Long Term Goals:  To be accomplished in 16 treatments:  Pt will report being able to garden for 1 hour with 0/10 pain in shoulder Met  Pt will be able to lift 10lbs to shoulder level to improve ability to put a gallon of milk on the shelf Met  Pt will report being able to lift a 20lb object from the ground to waist level to improve ability to carry groceries   Frequency / Duration: Patient to be seen 2 times per week for 8 weeks.         PLAN  [x]  Upgrade activities as tolerated     [x]  Continue plan of care  []  Update interventions per flow sheet       []  Discharge due to:_  []  Other:_      Melody Grant 9/5/2019

## 2019-09-05 NOTE — PROGRESS NOTES
Office Follow-up    NAME: Anju Brantley   :  1955  MRM:  [id number]    Date:  2019            Assessment:     Problem List  Date Reviewed: 2019          Codes Class Noted    High coronary artery calcium score ICD-10-CM: R93.1  ICD-9-CM: 414.00  2019        GERD (gastroesophageal reflux disease) ICD-10-CM: K21.9  ICD-9-CM: 530.81  Unknown        Hypothyroidism ICD-10-CM: E03.9  ICD-9-CM: 244.9  Unknown        Hypercholesteremia ICD-10-CM: E78.00  ICD-9-CM: 272.0  Unknown        Lumbar disc disease ICD-10-CM: M51.9  ICD-9-CM: 722.93  Unknown        Cavernous hemangioma of brain ([facility] ICD-10-CM: D18.02  ICD-9-CM: 228.02  Unknown        Pulmonary nodule ICD-10-CM: R91.1  ICD-9-CM: 793.11  Unknown    Overview Signed 3/8/2018 10:42 AM by Debra Cuellar MD     stable, last CT around              History of seizure ICD-10-CM: Z87.898  ICD-9-CM: V12.49  Unknown    Overview Signed 3/8/2018 10:48 AM by Debra Cuellar MD     once, 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 Positive coronary artery calcium score: Continue Zetia and Pravachol. We will repeat the NMR lipid profile in about 4 months from the last update when they were performed. Continue watching the diet. Continue increasing activities and losing weight. 2. Hypertension: Blood pressure is elevated. She is on very low dosages of ramipril and HCTZ. I will proceed with increasing the HCTZ to normal dosage of 25 mg p.o. daily. Monitor salt intake. Increase physical activity. 3. Dyslipidemia: As above. 4. Phone follow-up of the lab results and personal follow-up in 1 year. Subjective:     Anju Brantley, a 61y.o. year-old who presents for followup. She is returning today for follow-up.   She had a coronary calcium score which was at 80 with majority distribution in the LAD. We underwent E level was slightly elevated at 1000. I had added Zetia at that time to her Pravachol. She is returning today for follow-up. Denies any significant myopathic pains. In past she had myalgias from Lipitor that is why she had discontinued Lipitor. Her blood pressure today is slightly elevated. She is currently taking HCTZ and ramipril. She has had left shoulder pain at least once recently. No continued left chest pain or left shoulder pain. She has right shoulder pain which she is getting physical therapy. Prior phone encounter from 8/8/19 was added to this encounter for billing. Exam:     Physical Exam:  Visit Vitals  /86 (BP 1 Location: Left arm, BP Patient Position: Sitting)   Pulse 64   Resp 16   Ht 5' 6\" (1.676 m)   Wt 204 lb 9.6 oz (92.8 kg)   SpO2 97%   BMI 33.02 kg/m²     General appearance - alert, well appearing, and in no distress  Mental status - affect appropriate to mood  Eyes - sclera anicteric, moist mucous membranes  Neck - supple, no significant adenopathy  Chest - clear to auscultation, no wheezes, rales or rhonchi      Medications:     Current Outpatient Medications   Medication Sig    ezetimibe (ZETIA) 10 mg tablet Take 1 Tab by mouth daily.  coenzyme q10 (CO Q-10) 10 mg cap Take  by mouth daily.  multivitamin (ONE A DAY) tablet Take 1 Tab by mouth daily.  aspirin delayed-release (ASPIR-81) 81 mg tablet Take 1 Tab by mouth daily.  levothyroxine (SYNTHROID) 88 mcg tablet TAKE 1 TABLET ON AN EMPTY STOMACH, IN THE MORNING EVERY OTHER DAY ALTERNATING WITH 75 MCG    ibuprofen (MOTRIN) 600 mg tablet Take 1 Tab by mouth every six (6) hours as needed for Pain.     pravastatin (PRAVACHOL) 20 mg tablet TAKE 1 TABLET BY MOUTH EVERY DAY    hydroCHLOROthiazide (MICROZIDE) 12.5 mg capsule TAKE 1 CAPSULE BY MOUTH EVERY DAY    ramipril (ALTACE) 2.5 mg capsule TAKE 1 CAPSULE BY MOUTH ONCE A DAY FOR 90 DAYS    levothyroxine (SYNTHROID) 75 mcg tablet TAKE 1 TABLET BY MOUTH EVERY DAY    albuterol (PROAIR HFA) 90 mcg/actuation inhaler Take  by inhalation. No current facility-administered medications for this visit. Diagnostic Data Review:       No specialty comments available. Lab Review:     Lab Results   Component Value Date/Time    Cholesterol, total 169 08/13/2019 08:47 AM    Cholesterol, Total 180 08/13/2019 08:47 AM    HDL Cholesterol 61 08/13/2019 08:47 AM    LDL, calculated 87 08/13/2019 08:47 AM    Triglyceride 103 08/13/2019 08:47 AM     Lab Results   Component Value Date/Time    Creatinine 0.86 08/05/2019 11:37 AM     Lab Results   Component Value Date/Time    BUN 13 08/05/2019 11:37 AM     Lab Results   Component Value Date/Time    Potassium 4.7 08/05/2019 11:37 AM     No results found for: HBA1C, HGBE8, NGH0CGNU  No results found for: HGBPOC, HGB, HGBP, HGBEXT  No results found for: PLT, PLTEXT  No results for input(s): CPK, CKMB, TROIQ in the last 72 hours. No lab exists for component: CKQMB, CPKMB             ___________________________________________________    Kelly Hodgson.  Brendon Hoffmann MD, [facility]

## 2019-09-05 NOTE — PATIENT INSTRUCTIONS
Increase HCTZ to 25mg po daily. Check NMR lipids and fasting Lipids in 3 months. See Dr Gracie Bernard in 1 year.

## 2019-09-05 NOTE — PROGRESS NOTES
Medication changes made per VO of Dr. Darlin Li. INCREASE Microzide to 25 mg daily. NMR and FLP entered per VO of Dr. Darlin Li.  Dx: Cholesterol problem

## 2019-09-05 NOTE — PROGRESS NOTES
Denice Murphy is a 61 y.o. female    Chief Complaint   Patient presents with    Follow-up    Hypertension    Other     +CAC,DLD       Chest pain no    SOB no    Dizziness no    Swelling no    Refills no    Visit Vitals  /86 (BP 1 Location: Left arm, BP Patient Position: Sitting)   Pulse 64   Resp 16   Ht 5' 6\" (1.676 m)   Wt 204 lb 9.6 oz (92.8 kg)   SpO2 97%   BMI 33.02 kg/m²       1. Have you been to the ER, urgent care clinic since your last visit? Hospitalized since your last visit?  no    2. Have you seen or consulted any other health care providers outside of the [address] [place] [address] since your last visit? Include any pap smears or colon screening.  no

## 2019-09-10 ENCOUNTER — HOSPITAL ENCOUNTER (OUTPATIENT)
Dept: PHYSICAL THERAPY | Age: 64
Discharge: HOME | End: 2019-09-10
Payer: COMMERCIAL

## 2019-09-10 PROCEDURE — 97110 THERAPEUTIC EXERCISES: CPT

## 2019-09-10 PROCEDURE — 97014 ELECTRIC STIMULATION THERAPY: CPT

## 2019-09-10 NOTE — PROGRESS NOTES
PT DAILY TREATMENT NOTE - [facility] 2-15    Patient Name: Eris Bonner  Date:9/10/2019  : 1955  [x]  Patient  Verified  Payor: [employer] / Plan: Peter Lozano [id number] PPO / Product Type: PPO /    In time: 11:00 Out time: 12:00p  Total Treatment Time (min): 60  Total Timed Codes (min): 45  1:1 Treatment Time ( only): 30   Visit #:  18    Treatment Area: Bicipital tendinitis, right shoulder [M75.21]  Right shoulder pain [M25.511]    SUBJECTIVE  Pain Level (0-10 scale): 1  Any medication changes, allergies to medications, adverse drug reactions, diagnosis change, or new procedure performed?: [x] No    [] Yes (see summary sheet for update)  Subjective functional status/changes:   [x] No changes reported  Patient reports more pain over the last couple of days and is unsure why.     OBJECTIVE    Modality rationale: decrease pain and increase tissue extensibility to improve the patients ability to lift, carry, reach and complete ADL's   Min Type Additional Details      15 [x] Estim: []Att   [x]Unatt    []TENS instruct                  [x]IFC  []Premod   []NMES                     []Other:  []w/US   []w/ice:shoulder   [x]w/heat  Position: reclined  Location:R shoulder       []  Traction: [] Cervical       []Lumbar                       [] Prone          []Supine                       []Intermittent   []Continuous Lbs:  [] before manual  [] after manual  []w/heat    []  Ultrasound: []Continuous   [] Pulsed                       at: []1MHz   []3MHz Location:  W/cm2:    [] Paraffin         Location:   []w/heat    []  Ice     []  Heat  []  Ice massage Position:  Location:    []  Laser  []  Other: Position:  Location:      []  Vasopneumatic Device Pressure:       [] lo [] med [] hi   Temperature:      [x] Skin assessment post-treatment:  [x]intact []redness- no adverse reaction    []redness - adverse reaction:     45 min Therapeutic Exercise:  [x] See flow sheet :   Rationale: increase ROM, increase strength, improve coordination, improve balance and increase proprioception to improve the patients ability to lift, carry, reach and complete ADL's      With   [] TE   [] TA   [] neuro   [] other: Patient Education: [x] Review HEP    [] Progressed/Changed HEP based on:   [] positioning   [] body mechanics   [] transfers   [] heat/ice application    [] other:      Other Objective/Functional Measures: PROM WFL, no pain/tightness reported at end range, foto completed    AROM R Shoulder:  Flexion: 150 deg  Extension: 45 deg  Abduction: 155 deg  ER: 90 deg  FIR: L1    MMT R shoulder:  Flexion:5/5  ER/IR:5/5    Pain Level (0-10 scale) post treatment: 0    ASSESSMENT/Changes in Function:        []  See Plan of Care  []  See progress note/recertification  [x]  See Discharge Summary         Progress towards goals / Updated goals: Patient continues to have low level pain with concentric and eccentric flexion with any amount of weight. Recommend patient follow up with MD for further recommendations to decrease pain with overhead activities. Short Term Goals: To be accomplished in 8 treatments:  Pt will be able to reach overhead with 0/10 pain in order to improve ability to brush hair Met  Pt will be able to reach behind back with 0/10 pain to improve ability to put on belt Met  Pt will be able to lift 5lb weight to shoulder level to improve ability to perform yard work Met  Long Term Goals: To be accomplished in 16 treatments:  Pt will report being able to garden for 1 hour with 0/10 pain in shoulder Met  Pt will be able to lift 10lbs to shoulder level to improve ability to put a gallon of milk on the shelf Met  Pt will report being able to lift a 20lb object from the ground to waist level to improve ability to carry groceries Met  Frequency / Duration: Patient to be seen 2 times per week for 8 weeks.         PLAN  []  Upgrade activities as tolerated     []  Continue plan of care  []  Update interventions per flow sheet       [x] Discharge due to:_  [x]  Other:_follow up with MD Samuel Aquino 9/10/2019

## 2019-09-16 ENCOUNTER — OFFICE VISIT (OUTPATIENT)
Dept: FAMILY MEDICINE CLINIC | Age: 64
End: 2019-09-16

## 2019-09-16 VITALS
SYSTOLIC BLOOD PRESSURE: 132 MMHG | WEIGHT: 203 LBS | DIASTOLIC BLOOD PRESSURE: 82 MMHG | BODY MASS INDEX: 32.62 KG/M2 | RESPIRATION RATE: 18 BRPM | TEMPERATURE: 97 F | HEIGHT: 66 IN | HEART RATE: 66 BPM

## 2019-09-16 DIAGNOSIS — I10 ESSENTIAL HYPERTENSION: Primary | ICD-10-CM

## 2019-09-16 DIAGNOSIS — G89.29 CHRONIC RIGHT SHOULDER PAIN: ICD-10-CM

## 2019-09-16 DIAGNOSIS — M25.611 DECREASED RIGHT SHOULDER RANGE OF MOTION: ICD-10-CM

## 2019-09-16 DIAGNOSIS — M75.21 BICEPS TENDINITIS OF RIGHT UPPER EXTREMITY: ICD-10-CM

## 2019-09-16 DIAGNOSIS — M25.511 CHRONIC RIGHT SHOULDER PAIN: ICD-10-CM

## 2019-09-16 RX ORDER — HYDROCHLOROTHIAZIDE 25 MG/1
25 TABLET ORAL DAILY
COMMUNITY
End: 2019-10-07 | Stop reason: SDUPTHER

## 2019-09-16 RX ORDER — ACETAMINOPHEN 160 MG/5ML
200 SUSPENSION, ORAL (FINAL DOSE FORM) ORAL DAILY
COMMUNITY

## 2019-09-16 NOTE — PROGRESS NOTES
HISTORY OF PRESENT ILLNESS  Fercho Forman is a 61 y.o. female. Fercho Forman is here to follow up on their HTN. This is a chronic problem. The problem occurs constantly and is improved. The symptoms are relieved by hydrochlorothiazide, Altace, which is/are working well. Also, I have seen her for right biceps tendonitis. She has been in PT with improvement and she has pain free full range of motion but still has pain when lifting things down from overhead. PT wants to know if she can continue. She is working on strength and her pain is getting worse. Review of Systems   Constitutional: Negative for weight loss. No weight gain   Eyes: Negative for blurred vision. Respiratory: Negative for shortness of breath. Cardiovascular: Negative for chest pain and leg swelling. Gastrointestinal: Negative for abdominal pain. Musculoskeletal: Positive for joint pain. Neurological: Positive for headaches. Negative for dizziness, tingling, sensory change, speech change and focal weakness. Occasional headaches       Visit Vitals  /82   Pulse 66   Temp 97 °F (36.1 °C) (Oral)   Resp 18   Ht 5' 6\" (1.676 m)   Wt 203 lb (92.1 kg)   BMI 32.77 kg/m²     BP Readings from Last 3 Encounters:   09/05/19 140/86   08/08/19 140/78   08/05/19 149/82     Physical Exam   Constitutional: She is oriented to person, place, and time. She appears well-developed and well-nourished. No distress. Cardiovascular: Normal rate, regular rhythm and normal heart sounds. Exam reveals no gallop and no friction rub. No murmur heard. Pulmonary/Chest: Effort normal and breath sounds normal. No respiratory distress. She has no wheezes. She has no rales. Musculoskeletal: She exhibits no edema. Right shoulder: She exhibits decreased range of motion, tenderness and pain. She exhibits no bony tenderness and no deformity.    Right biceps tendon tender   Neurological: She is alert and oriented to person, place, and time. Skin: Skin is warm and dry. She is not diaphoretic. Nursing note and vitals reviewed. ASSESSMENT and PLAN    ICD-10-CM ICD-9-CM    1. Essential hypertension Z16 944.4 METABOLIC PANEL, BASIC   2. Chronic right shoulder pain M25.511 719.41     G89.29 338.29    3. Decreased right shoulder range of motion M25.611 719.51         Blood pressure controlled  Shoulder pain slightly worse, range of motion decreased but no better  Injection dicussed, after discussing risks, she would not like to do this at this point  Will try 3 more weeks of PT and she will call for an injection if she is not progressing  Continue current plans. Follow-up and Dispositions    · Return in about 6 months (around 3/16/2020) for blood pressure. Reviewed plan of care. Patient has provided input and agrees with goals.

## 2019-09-17 LAB
BUN SERPL-MCNC: 15 MG/DL (ref 8–27)
BUN/CREAT SERPL: 17 (ref 12–28)
CALCIUM SERPL-MCNC: 9.8 MG/DL (ref 8.7–10.3)
CHLORIDE SERPL-SCNC: 98 MMOL/L (ref 96–106)
CO2 SERPL-SCNC: 29 MMOL/L (ref 20–29)
CREAT SERPL-MCNC: 0.89 MG/DL (ref 0.57–1)
GLUCOSE SERPL-MCNC: 95 MG/DL (ref 65–99)
POTASSIUM SERPL-SCNC: 4.2 MMOL/L (ref 3.5–5.2)
SODIUM SERPL-SCNC: 143 MMOL/L (ref 134–144)

## 2019-09-17 RX ORDER — EZETIMIBE 10 MG/1
10 TABLET ORAL DAILY
Qty: 90 TAB | Refills: 1 | Status: SHIPPED | OUTPATIENT
Start: 2019-09-17 | End: 2020-01-31

## 2019-09-17 NOTE — TELEPHONE ENCOUNTER
Refill of Zetia 10 mg daily completed per VO of Dr. Kateryna Trujillo.     Last OV: 9/2019  Next OV: 9/2020

## 2019-10-11 RX ORDER — HYDROCHLOROTHIAZIDE 25 MG/1
25 TABLET ORAL DAILY
Qty: 90 TAB | Refills: 4 | Status: SHIPPED | OUTPATIENT
Start: 2019-10-11 | End: 2019-12-09 | Stop reason: SDUPTHER

## 2019-10-11 NOTE — TELEPHONE ENCOUNTER
----- Message from Bernadette Moctezuma sent at 10/11/2019 12:34 PM EDT -----  Regarding: Dr. Bib Walton (if not patient):      Relationship of caller (if not patient):      Best contact number(s): [phone]      Name of medication and dosage if known:Hydrochlorothiazide\" 25 mg from 12.5mg. Is patient out of this medication (yes/no):yes, within a wk      Ph[facility]: [facility] [employer] listed in chart? (yes/no):yes  Pharmacy phone number:      Details to clarify the request:Pt stated she requested a refill on Rx on Monday and have not been called to the pharmacy and she can not it through the mar due to dosage increase and will be out by next wk.       Bernadette Moctezuma

## 2019-11-06 ENCOUNTER — TELEPHONE (OUTPATIENT)
Dept: FAMILY MEDICINE CLINIC | Age: 64
End: 2019-11-06

## 2019-11-06 RX ORDER — LEVOTHYROXINE SODIUM 75 UG/1
75 TABLET ORAL
Qty: 90 TAB | Refills: 3 | OUTPATIENT
Start: 2019-11-06

## 2019-11-06 NOTE — PROGRESS NOTES
[address]  Phone: [phone]  Fax: [phone]    Discharge Summary  2-15    Patient name: Reece Stephen  : 1955  Provider#: [id number]  Referral source: Forest Castano MD      Medical/Treatment Diagnosis: Bicipital tendinitis, right shoulder [M75.21]  Right shoulder pain [M25.511]     Prior Hospitalization: see medical history     Comorbidities: See Plan of Care  Prior Level of Function:See Plan of Care  Medications: Verified on Patient Summary List    Start of Care: 19      Onset Date:19   Visits from Start of Care: 18     Missed Visits: 0  Reporting Period : 19 to 9/10/19      ASSESSMENT/SUMMARY OF CARE: Patient did well with PT with a focus on manual therapy, modalities, and a gradual progression of therapeutic exercises. On her last visit, she had a significant improvement ROM and strength at the shoulder, but continued to have a report of mild pain at end range of motion.       Short Term Goals: To be accomplished in 8 treatments:  Pt will be able to reach overhead with 0/10 pain in order to improve ability to brush hair. Met. Pt will be able to reach behind back with 0/10 pain to improve ability to put on belt  Met. Pt will be able to lift 5lb weight to shoulder level to improve ability to perform yard work  Met. Long Term Goals: To be accomplished in 16 treatments:  Pt will report being able to garden for 1 hour with 0/10 pain in shoulder  Not met. Pt will be able to lift 10lbs to shoulder level to improve ability to put a gallon of milk on the shelf  Met. Pt will report being able to lift a 20lb object from the ground to waist level to improve ability to carry groceries  Met.         RECOMMENDATIONS:  [x]Discontinue therapy: [x]Patient has reached or is progressing toward set goals      []Patient is non-compliant or has abdicated      []Due to lack of appreciable progress towards set goals      []Other    Leonie Abdalla, PT , DPT, OCS, Cert.  DN   11/6/2019

## 2019-11-07 ENCOUNTER — TELEPHONE (OUTPATIENT)
Dept: FAMILY MEDICINE CLINIC | Age: 64
End: 2019-11-07

## 2019-11-07 DIAGNOSIS — E03.9 ACQUIRED HYPOTHYROIDISM: ICD-10-CM

## 2019-11-07 RX ORDER — LEVOTHYROXINE SODIUM 75 UG/1
TABLET ORAL
Qty: 45 TAB | Refills: 3 | Status: SHIPPED | OUTPATIENT
Start: 2019-11-07 | End: 2020-09-20

## 2019-11-07 RX ORDER — LEVOTHYROXINE SODIUM 88 UG/1
TABLET ORAL
Qty: 45 TAB | Refills: 3 | Status: SHIPPED | OUTPATIENT
Start: 2019-11-07 | End: 2019-11-17 | Stop reason: SDUPTHER

## 2019-11-07 NOTE — TELEPHONE ENCOUNTER
----- Message from [address] sent at 11/7/2019  8:11 AM EST -----  Regarding: Dr. Jonathan Kimbrough (if not patient):  Relationship of caller (if not patient):  Best contact number(s): [phone]  Name of medication and dosage if known: Levothyroxine 75mcg every other day  Is patient out of this medication (yes/no): No  Pharmacy name: [address] [place] [address] listed in chart? (yes/no): Yes  Pharmacy phone number: [phone]  Details to clarify the request: Pt advised that she thinks her previous medication request was denied because of the dosage.  She says the bottle says to take one everyday but she takes one every other day     [address]

## 2019-11-07 NOTE — TELEPHONE ENCOUNTER
Please call patient and let her know I refilled her prescriptions and worded them differently, so they should be approved.

## 2019-11-17 DIAGNOSIS — I10 ESSENTIAL HYPERTENSION: ICD-10-CM

## 2019-11-17 DIAGNOSIS — E78.00 HYPERCHOLESTEREMIA: ICD-10-CM

## 2019-11-17 DIAGNOSIS — E03.9 ACQUIRED HYPOTHYROIDISM: ICD-10-CM

## 2019-11-20 RX ORDER — RAMIPRIL 2.5 MG/1
2.5 CAPSULE ORAL DAILY
Qty: 90 CAP | Refills: 3 | Status: SHIPPED | OUTPATIENT
Start: 2019-11-20 | End: 2019-11-30 | Stop reason: SDUPTHER

## 2019-11-20 RX ORDER — PRAVASTATIN SODIUM 20 MG/1
20 TABLET ORAL
Qty: 90 TAB | Refills: 3 | Status: SHIPPED | OUTPATIENT
Start: 2019-11-20 | End: 2020-11-09 | Stop reason: SDUPTHER

## 2019-11-20 RX ORDER — LEVOTHYROXINE SODIUM 88 UG/1
TABLET ORAL
Qty: 45 TAB | Refills: 3 | Status: SHIPPED | OUTPATIENT
Start: 2019-11-20 | End: 2020-11-09 | Stop reason: SDUPTHER

## 2019-11-27 DIAGNOSIS — I10 ESSENTIAL HYPERTENSION: ICD-10-CM

## 2019-11-30 RX ORDER — RAMIPRIL 2.5 MG/1
CAPSULE ORAL
Qty: 90 CAP | Refills: 3 | Status: SHIPPED | OUTPATIENT
Start: 2019-11-30 | End: 2019-12-03 | Stop reason: SDUPTHER

## 2019-11-30 RX ORDER — HYDROCHLOROTHIAZIDE 12.5 MG/1
CAPSULE ORAL
Qty: 90 CAP | Refills: 3 | OUTPATIENT
Start: 2019-11-30

## 2019-12-03 DIAGNOSIS — I10 ESSENTIAL HYPERTENSION: ICD-10-CM

## 2019-12-03 NOTE — TELEPHONE ENCOUNTER
----- Message from Terrell Cho sent at 12/3/2019 10:39 AM EST -----  Regarding: Dr. Hellen Libman: [phone]  General Message/Vendor Calls    Caller's first and last name: Leland Sprague  Reason for call: Pt reported that she no longer uses [facility] pharmacy on file.  She now uses [facility] [employer] required yes/no and why: No  Best contact number(s): [phone]  Details to clarify the request:

## 2019-12-05 DIAGNOSIS — I10 ESSENTIAL HYPERTENSION: ICD-10-CM

## 2019-12-05 DIAGNOSIS — R93.1 HIGH CORONARY ARTERY CALCIUM SCORE: ICD-10-CM

## 2019-12-05 DIAGNOSIS — I25.119 CORONARY ARTERY DISEASE INVOLVING NATIVE HEART WITH ANGINA PECTORIS, UNSPECIFIED VESSEL OR LESION TYPE (HCC): ICD-10-CM

## 2019-12-06 LAB
CHOLEST SERPL-MCNC: 158 MG/DL (ref 100–199)
CHOLEST SERPL-MCNC: 160 MG/DL (ref 100–199)
HDL SERPL-SCNC: 45.2 UMOL/L
HDLC SERPL-MCNC: 60 MG/DL
HDLC SERPL-MCNC: 62 MG/DL
INTERPRETATION, 910389: NORMAL
LDL SERPL QN: 20.8 NM
LDL SERPL-SCNC: 920 NMOL/L
LDL SMALL SERPL-SCNC: 428 NMOL/L
LDLC SERPL CALC-MCNC: 67 MG/DL (ref 0–99)
LDLC SERPL CALC-MCNC: 71 MG/DL (ref 0–99)
LP-IR SCORE SERPL: 56
TRIGL SERPL-MCNC: 143 MG/DL (ref 0–149)
TRIGL SERPL-MCNC: 143 MG/DL (ref 0–149)
VLDLC SERPL CALC-MCNC: 29 MG/DL (ref 5–40)

## 2019-12-08 RX ORDER — RAMIPRIL 2.5 MG/1
2.5 CAPSULE ORAL DAILY
Qty: 90 CAP | Refills: 3 | Status: SHIPPED | OUTPATIENT
Start: 2019-12-08 | End: 2020-11-09 | Stop reason: SDUPTHER

## 2019-12-11 RX ORDER — HYDROCHLOROTHIAZIDE 25 MG/1
25 TABLET ORAL DAILY
Qty: 90 TAB | Refills: 3 | Status: SHIPPED | OUTPATIENT
Start: 2019-12-11 | End: 2020-12-20 | Stop reason: SDUPTHER

## 2019-12-12 ENCOUNTER — TELEPHONE (OUTPATIENT)
Dept: FAMILY MEDICINE CLINIC | Age: 64
End: 2019-12-12

## 2019-12-12 NOTE — TELEPHONE ENCOUNTER
----- Message from [address] sent at 12/12/2019  9:20 AM EST -----  Regarding: Dr Bob Steven first and last name: Leland Sprague       Reason for call: Pt advised that she no longer uses [facility]. She only uses [address] on file, and she doesn't need any refills right now.       Callback required yes/no and why: No      Best contact number(s): [phone]      Details to clarify the request:      [address]

## 2019-12-30 NOTE — PROGRESS NOTES
Called, spoke to pt. Two pt identifiers confirmed. Pt informed per Dr. Raul Gonzalez that her lipids were normal and to continue the same medications. Pt states she had stopped the Zetia right after the blood work since it caused nausea and vomiting. Pt states she didn't want to try any other medications at this time. Pt states she wants to work on a healthy diet and exercise and to get blood work checked at her next visit. Pt verbalized understanding of information discussed w/ no further questions at this time.

## 2020-01-31 ENCOUNTER — OFFICE VISIT (OUTPATIENT)
Dept: FAMILY MEDICINE CLINIC | Age: 65
End: 2020-01-31

## 2020-01-31 VITALS — WEIGHT: 204 LBS | TEMPERATURE: 96.1 F | RESPIRATION RATE: 18 BRPM | HEIGHT: 66 IN | BODY MASS INDEX: 32.78 KG/M2

## 2020-01-31 DIAGNOSIS — N30.01 ACUTE CYSTITIS WITH HEMATURIA: Primary | ICD-10-CM

## 2020-01-31 DIAGNOSIS — R35.0 URINARY FREQUENCY: ICD-10-CM

## 2020-01-31 DIAGNOSIS — R39.15 URINARY URGENCY: ICD-10-CM

## 2020-01-31 LAB
BILIRUB UR QL STRIP: NEGATIVE
GLUCOSE UR-MCNC: NEGATIVE MG/DL
KETONES P FAST UR STRIP-MCNC: NEGATIVE MG/DL
PH UR STRIP: 6 [PH] (ref 4.6–8)
PROT UR QL STRIP: NEGATIVE
SP GR UR STRIP: 1.01 (ref 1–1.03)
UA UROBILINOGEN AMB POC: NORMAL (ref 0.2–1)
URINALYSIS CLARITY POC: CLEAR
URINALYSIS COLOR POC: YELLOW
URINE BLOOD POC: NORMAL
URINE LEUKOCYTES POC: NORMAL
URINE NITRITES POC: NEGATIVE

## 2020-01-31 RX ORDER — ALBUTEROL SULFATE 90 UG/1
2 AEROSOL, METERED RESPIRATORY (INHALATION)
Qty: 1 INHALER | Refills: 0 | Status: SHIPPED | OUTPATIENT
Start: 2020-01-31

## 2020-01-31 RX ORDER — CEPHALEXIN 500 MG/1
500 CAPSULE ORAL 2 TIMES DAILY
Qty: 14 CAP | Refills: 0 | Status: SHIPPED | OUTPATIENT
Start: 2020-01-31 | End: 2020-02-07

## 2020-01-31 NOTE — PROGRESS NOTES
HISTORY OF PRESENT ILLNESS  Yuridia Tubbs is a 59 y.o. female. Patient presents with:  Abdominal Pain: bloating, pt states rt side enlargement; since December pt states most symptoms have resided since   Urinary Frequency    Her frequency started at the end of December and are unchanged. She has had similar symptoms with a UTI before. Review of Systems   Constitutional: Negative for chills, fever and malaise/fatigue. Gastrointestinal: Negative for abdominal pain. Occasional suprapubic discomfort   Genitourinary: Positive for frequency and urgency. Negative for dysuria, flank pain and hematuria. Visit Vitals  Temp 96.1 °F (35.6 °C) (Oral)   Resp 18   Ht 5' 6\" (1.676 m)   Wt 204 lb (92.5 kg)   BMI 32.93 kg/m²     Physical Exam  Vitals signs and nursing note reviewed. Constitutional:       General: She is not in acute distress. Appearance: Normal appearance. She is well-developed. She is not diaphoretic. Cardiovascular:      Rate and Rhythm: Normal rate and regular rhythm. Heart sounds: Normal heart sounds. No murmur. No friction rub. No gallop. Pulmonary:      Effort: Pulmonary effort is normal. No respiratory distress. Breath sounds: Normal breath sounds. No wheezing or rales. Abdominal:      General: Bowel sounds are normal. There is no distension. Palpations: Abdomen is soft. Tenderness: There is no abdominal tenderness. There is no guarding or rebound. Comments: Suprapubic discomfort to palpation   Skin:     General: Skin is warm and dry. Neurological:      Mental Status: She is alert and oriented to person, place, and time. Urine dipstick shows positive for WBC's and positive for RBC's. ASSESSMENT and PLAN    ICD-10-CM ICD-9-CM    1. Acute cystitis with hematuria N30.01 595.0 cephALEXin (KEFLEX) 500 mg capsule      CULTURE, URINE   2. Urinary frequency R35.0 788.41 AMB POC URINALYSIS DIP STICK AUTO W/O MICRO   3.  Urinary urgency R39.15 788.63 AMB POC URINALYSIS DIP STICK AUTO W/O MICRO        Keflex  Push fluids  Urine culture    Follow-up and Dispositions    · Return if not better in 3 days. Reviewed plan of care. Patient has provided input and agrees with goals.

## 2020-01-31 NOTE — PROGRESS NOTES
Chief Complaint   Patient presents with    Abdominal Pain     bloating, pt states rt side enlargement; since December pt states most symptoms have resided since    [facility] Stamp Urinary Frequency     1. Have you been to the ER, urgent care clinic since your last visit? Hospitalized since your last visit? No     2. Have you seen or consulted any other health care providers outside of the [address] [place] [address] since your last visit? Include any pap smears or colon screening. No     Pt had her shingrix vaccine and is updated in her chart. Pt has not had flu vaccine.

## 2020-02-03 ENCOUNTER — TELEPHONE (OUTPATIENT)
Dept: FAMILY MEDICINE CLINIC | Age: 65
End: 2020-02-03

## 2020-09-10 ENCOUNTER — OFFICE VISIT (OUTPATIENT)
Dept: CARDIOLOGY CLINIC | Age: 65
End: 2020-09-10
Payer: COMMERCIAL

## 2020-09-10 VITALS
DIASTOLIC BLOOD PRESSURE: 80 MMHG | HEIGHT: 66 IN | WEIGHT: 209 LBS | HEART RATE: 77 BPM | SYSTOLIC BLOOD PRESSURE: 130 MMHG | OXYGEN SATURATION: 100 % | BODY MASS INDEX: 33.59 KG/M2

## 2020-09-10 DIAGNOSIS — R93.1 HIGH CORONARY ARTERY CALCIUM SCORE: ICD-10-CM

## 2020-09-10 DIAGNOSIS — I10 ESSENTIAL HYPERTENSION: Primary | ICD-10-CM

## 2020-09-10 PROCEDURE — 93000 ELECTROCARDIOGRAM COMPLETE: CPT | Performed by: INTERNAL MEDICINE

## 2020-09-10 PROCEDURE — 99214 OFFICE O/P EST MOD 30 MIN: CPT | Performed by: INTERNAL MEDICINE

## 2020-09-10 RX ORDER — MELATONIN 5 MG
5 CAPSULE ORAL
COMMUNITY

## 2020-09-10 NOTE — PROGRESS NOTES
Mirna Finnegan is a 59 y.o. female    Visit Vitals  /80 (BP 1 Location: Left arm, BP Patient Position: Sitting)   Pulse 77   Ht 5' 6\" (1.676 m)   Wt 209 lb (94.8 kg)   SpO2 100%   BMI 33.73 kg/m²       Chief Complaint   Patient presents with    Annual Exam    Hypertension    Other     dld       Chest pain no  SOB no  Dizziness no  Swelling no  Recent hospital visit no  Refills no

## 2020-09-10 NOTE — PROGRESS NOTES
Office Follow-up    NAME: Richardson Graham   :  1955  MRM:  [id number]    Date:  9/10/2020            Assessment:     Problem List  Date Reviewed: 2020          Codes Class Noted    High coronary artery calcium score ICD-10-CM: R93.1  ICD-9-CM: 414.00  2019        GERD (gastroesophageal reflux disease) ICD-10-CM: K21.9  ICD-9-CM: 530.81  Unknown        Hypothyroidism ICD-10-CM: E03.9  ICD-9-CM: 244.9  Unknown        Hypercholesteremia ICD-10-CM: E78.00  ICD-9-CM: 272.0  Unknown        Lumbar disc disease ICD-10-CM: M51.9  ICD-9-CM: 722.93  Unknown        Cavernous hemangioma of brain ([facility] [address] ICD-10-CM: D18.02  ICD-9-CM: 228.02  Unknown        Pulmonary nodule ICD-10-CM: R91.1  ICD-9-CM: 793.11  Unknown    Overview Signed 3/8/2018 10:42 AM by Trevor Hendricks MD     stable, last CT around              History of seizure ICD-10-CM: Z87.898  ICD-9-CM: V12.49  Unknown    Overview Signed 3/8/2018 10:48 AM by Trevor Hendricks MD     once, 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 Positive coronary artery calcium score: Stopped Zetia due to GI side effects. Continue Pravachol. Continue watching the diet. Continue increasing activities and losing weight. 2. CAD by CAC: CAC of 93. Continue ASA, Pravachol. 3. Hypertension: Blood pressure is controlled. Continue Ramipril and HCTZ. Monitor salt intake. Increase physical activity. 4. Dyslipidemia: As above. 5. See Dr. Alexandro Matthews in 1 year. Subjective:     Richardson Graham, a 59y.o. year-old who presents for followup. She is returning today for follow-up. She had a coronary calcium score which was at 80 with majority distribution in the LAD. Was not able to tolerate Zetia due to GI sideeffects. No symptoms of chestpain, sob, palpitations.        Exam:     Physical Exam:  Visit Vitals  /80 (BP 1 Location: Left arm, BP Patient Position: Sitting)   Pulse 77   Ht 5' 6\" (1.676 m)   Wt 209 lb (94.8 kg)   SpO2 100%   BMI 33.73 kg/m²     General appearance - alert, well appearing, and in no distress  Mental status - affect appropriate to mood  Eyes - sclera anicteric, moist mucous membranes  Neck - supple, no significant adenopathy  Chest - clear to auscultation, no wheezes, rales or rhonchi      Medications:     Current Outpatient Medications   Medication Sig    melatonin 5 mg cap capsule Take 5 mg by mouth nightly.  albuterol (PROAIR HFA) 90 mcg/actuation inhaler Take 2 Puffs by inhalation every four (4) hours as needed for Wheezing.  hydroCHLOROthiazide (HYDRODIURIL) 25 mg tablet Take 1 Tab by mouth daily.  ramipril (ALTACE) 2.5 mg capsule Take 1 Cap by mouth daily.  levothyroxine (SYNTHROID) 88 mcg tablet TAKE 1 TABLET ON AN EMPTY STOMACH, IN THE MORNING EVERY OTHER DAY ALTERNATING WITH 75 MCG    pravastatin (PRAVACHOL) 20 mg tablet Take 1 Tab by mouth nightly.  levothyroxine (SYNTHROID) 75 mcg tablet TAKE 1 TABLET BY MOUTH EVERY DAY, ALTERNATING WITH 88 MCG    co-enzyme Q-10 (CO Q-10) 100 mg capsule Take 100 mg by mouth daily.  multivitamin (ONE A DAY) tablet Take 1 Tab by mouth daily.  aspirin delayed-release (ASPIR-81) 81 mg tablet Take 1 Tab by mouth daily. No current facility-administered medications for this visit. Diagnostic Data Review:       No specialty comments available.       Lab Review:     Lab Results   Component Value Date/Time    Cholesterol, total 158 12/05/2019 08:49 AM    Cholesterol, Total 160 12/05/2019 08:49 AM    HDL Cholesterol 62 12/05/2019 08:49 AM    LDL, calculated 67 12/05/2019 08:49 AM    Triglyceride 143 12/05/2019 08:49 AM     Lab Results   Component Value Date/Time    Creatinine 0.89 09/16/2019 11:50 AM     Lab Results   Component Value Date/Time    BUN 15 09/16/2019 11:50 AM     Lab Results   Component Value Date/Time    Potassium 4.2 09/16/2019 11:50 AM     No results found for: HBA1C, HGBE8, CKM5IBBE, GCA8MABJ  No results found for: HGBPOC, HGB, HGBP, HGBEXT, HGBEXT  No results found for: PLT, PLTEXT, PLTEXT  No results for input(s): CPK, CKMB, TROIQ in the last 72 hours. No lab exists for component: CKQMB, CPKMB             ___________________________________________________    Orestes Mcdonald.  Isrrael Rashid MD, [facility]

## 2020-09-20 DIAGNOSIS — I10 ESSENTIAL HYPERTENSION: ICD-10-CM

## 2020-09-20 DIAGNOSIS — E03.9 ACQUIRED HYPOTHYROIDISM: ICD-10-CM

## 2020-09-20 DIAGNOSIS — E78.00 HYPERCHOLESTEREMIA: ICD-10-CM

## 2020-09-20 DIAGNOSIS — I10 ESSENTIAL HYPERTENSION: Primary | ICD-10-CM

## 2020-09-20 RX ORDER — LEVOTHYROXINE SODIUM 75 UG/1
TABLET ORAL
Qty: 45 TAB | Refills: 0 | Status: SHIPPED | OUTPATIENT
Start: 2020-09-20 | End: 2020-11-09 | Stop reason: SDUPTHER

## 2020-10-09 LAB
ALBUMIN SERPL-MCNC: 4.5 G/DL (ref 3.8–4.8)
ALP SERPL-CCNC: 57 IU/L (ref 39–117)
ALT SERPL-CCNC: 12 IU/L (ref 0–32)
AST SERPL-CCNC: 15 IU/L (ref 0–40)
BILIRUB DIRECT SERPL-MCNC: 0.16 MG/DL (ref 0–0.4)
BILIRUB SERPL-MCNC: 0.6 MG/DL (ref 0–1.2)
BUN SERPL-MCNC: 13 MG/DL (ref 8–27)
BUN/CREAT SERPL: 13 (ref 12–28)
CALCIUM SERPL-MCNC: 9.7 MG/DL (ref 8.7–10.3)
CHLORIDE SERPL-SCNC: 102 MMOL/L (ref 96–106)
CHOLEST SERPL-MCNC: 170 MG/DL (ref 100–199)
CO2 SERPL-SCNC: 28 MMOL/L (ref 20–29)
CREAT SERPL-MCNC: 0.99 MG/DL (ref 0.57–1)
GLUCOSE SERPL-MCNC: 100 MG/DL (ref 65–99)
HDLC SERPL-MCNC: 54 MG/DL
INTERPRETATION, 910389: NORMAL
LDLC SERPL CALC-MCNC: 94 MG/DL (ref 0–99)
POTASSIUM SERPL-SCNC: 3.5 MMOL/L (ref 3.5–5.2)
PROT SERPL-MCNC: 6.8 G/DL (ref 6–8.5)
SODIUM SERPL-SCNC: 142 MMOL/L (ref 134–144)
TRIGL SERPL-MCNC: 125 MG/DL (ref 0–149)
TSH SERPL DL<=0.005 MIU/L-ACNC: 4.26 UIU/ML (ref 0.45–4.5)
VLDLC SERPL CALC-MCNC: 22 MG/DL (ref 5–40)

## 2020-10-19 ENCOUNTER — OFFICE VISIT (OUTPATIENT)
Dept: FAMILY MEDICINE CLINIC | Age: 65
End: 2020-10-19
Payer: COMMERCIAL

## 2020-10-19 VITALS
HEIGHT: 66 IN | TEMPERATURE: 97.2 F | DIASTOLIC BLOOD PRESSURE: 79 MMHG | HEART RATE: 70 BPM | OXYGEN SATURATION: 98 % | RESPIRATION RATE: 20 BRPM | SYSTOLIC BLOOD PRESSURE: 133 MMHG | WEIGHT: 213 LBS | BODY MASS INDEX: 34.23 KG/M2

## 2020-10-19 DIAGNOSIS — M77.8 RIGHT WRIST TENDONITIS: ICD-10-CM

## 2020-10-19 DIAGNOSIS — I10 ESSENTIAL HYPERTENSION: Primary | ICD-10-CM

## 2020-10-19 PROCEDURE — 99213 OFFICE O/P EST LOW 20 MIN: CPT | Performed by: FAMILY MEDICINE

## 2020-10-19 RX ORDER — HYDROCHLOROTHIAZIDE 25 MG/1
25 TABLET ORAL DAILY
Qty: 90 TAB | Refills: 3 | Status: CANCELLED | OUTPATIENT
Start: 2020-10-19

## 2020-10-19 RX ORDER — LEVOTHYROXINE SODIUM 88 UG/1
TABLET ORAL
Qty: 45 TAB | Refills: 3 | Status: CANCELLED | OUTPATIENT
Start: 2020-10-19

## 2020-10-19 RX ORDER — LEVOTHYROXINE SODIUM 75 UG/1
TABLET ORAL
Qty: 45 TAB | Refills: 0 | Status: CANCELLED | OUTPATIENT
Start: 2020-10-19

## 2020-10-19 RX ORDER — IBUPROFEN 600 MG/1
600 TABLET ORAL
Qty: 30 TAB | Refills: 1 | Status: SHIPPED | OUTPATIENT
Start: 2020-10-19 | End: 2021-11-15

## 2020-10-19 NOTE — PROGRESS NOTES
HISTORY OF PRESENT ILLNESS  Milan Saez is a 59 y.o. female. Patient presents with:  Hypertension: followup - pt will mycahrt when ready for refills   Wrist Pain: right Lorna Madison is here to follow up on their HTN. This is a chronic problem. The problem occurs constantly and is stable. The symptoms are relieved by hydrochlorothiazide and Altace, which is/are working well. Also, her wrist has been bothering her for about 2 weeks and is getting worse. .  Drinking a cup of coffee or playing on her IPad makes her worse. She tried splinting a few times which helped a little. Sometimes the pain shoots into the elbow. No history of trauma or overuse. Sometimes she wakes up and needs to shake her hand. She sews a great deal.      Review of Systems   Constitutional: Negative for weight loss. No weight gain   Eyes: Negative for blurred vision. Respiratory: Negative for shortness of breath. Cardiovascular: Negative for chest pain and leg swelling. Gastrointestinal: Negative for abdominal pain. Musculoskeletal: Positive for joint pain. No wrist swelling   Skin:        No wrist redness or warmth. Neurological: Positive for tingling and sensory change. Negative for dizziness, speech change, focal weakness and headaches. The entire hand tingles a little from time to time       Visit Vitals  /79   Pulse 70   Temp 97.2 °F (36.2 °C) (Temporal)   Resp 20   Ht 5' 6\" (1.676 m)   Wt 213 lb (96.6 kg)   SpO2 98%   BMI 34.38 kg/m²       BP Readings from Last 3 Encounters:   09/10/20 130/80   09/16/19 132/82   09/05/19 140/86     Physical Exam  Vitals signs and nursing note reviewed. Constitutional:       General: She is not in acute distress. Appearance: She is well-developed. She is not diaphoretic. Cardiovascular:      Rate and Rhythm: Normal rate and regular rhythm. Heart sounds: Normal heart sounds. No murmur. No friction rub. No gallop.     Pulmonary: Effort: Pulmonary effort is normal. No respiratory distress. Breath sounds: Normal breath sounds. No wheezing or rales. Musculoskeletal:      Right wrist: She exhibits tenderness. She exhibits normal range of motion, no bony tenderness, no swelling and no deformity. Arms:       Right hand: She exhibits normal capillary refill, no deformity and no swelling. Normal sensation noted. Normal strength noted. Comments: Negative Tinel's, negative Phalen's. Skin:     General: Skin is warm and dry. Neurological:      Mental Status: She is alert and oriented to person, place, and time. ASSESSMENT and PLAN    ICD-10-CM ICD-9-CM    1. Essential hypertension  I10 401.9    2. Right wrist tendonitis  M77.8 727.05 ibuprofen (MOTRIN) 600 mg tablet        Blood pressure controlled  Tendonitis due to overuse  Rest, ice, Motrin prn  Continue current plans. Follow-up and Dispositions    · Return in about 6 months (around 4/19/2021) for blood pressure, if wrist not better in 4 weeks. Reviewed plan of care. Patient has provided input and agrees with goals.

## 2020-10-19 NOTE — PROGRESS NOTES
Chief Complaint   Patient presents with    Hypertension     followup - pt will mycahrt when ready for refills     Wrist Pain     right wrisit

## 2020-10-19 NOTE — PATIENT INSTRUCTIONS
Tenosynovitis of the Wrist: Care Instructions Your Care Instructions Tenosynovitis (say \"ten-oh-sin-uh-VY-tus\") means the lining of a tendon is inflamed. This problem usually affects tendons in your thumb and wrist. A tendon is a cord that joins muscle to bone. Tenosynovitis can be caused by an injury. Or it may be caused by repeating a movement over and over, such as when you knit, lift things, or play video games. In rare cases, an infected wound causes it. In most cases, you can recover fully. But if the problem is caused by doing something over and over and you don't stop or change doing that, it may come back. Follow-up care is a key part of your treatment and safety. Be sure to make and go to all appointments, and call your doctor if you are having problems. It's also a good idea to know your test results and keep a list of the medicines you take. How can you care for yourself at home? · Prop up the sore wrist on a pillow when you ice it or anytime you sit or lie down during the next 3 days. Try to keep it above the level of your heart. This will help reduce swelling. · Put ice or cold packs on your wrist for 10 to 20 minutes at a time. Try to do this every 1 to 2 hours for the next 3 days (when you are awake) or until the swelling goes down. Put a thin cloth between the ice pack and your skin. · If your swelling is gone after 2 or 3 days, put a heating pad set on low or a warm cloth on your wrist for 15 to 20 minutes. This can reduce pain. · If your doctor gave you an elastic bandage, keep it on for the next 24 to 36 hours or for as long as your doctor told you. The bandage should be snug. But it should not be tight enough to cause numbness, tingling, or swelling. · If your doctor gave you a splint or brace, wear it as directed. It will protect your wrist until it is better. · Take pain medicines exactly as directed.  
? If the doctor gave you a prescription medicine for pain, take it as prescribed. ? If you are not taking a prescription pain medicine, ask your doctor if you can take an over-the-counter medicine. · If your doctor prescribes antibiotics, take them as directed. Do not stop taking them just because you feel better. You need to take the full course of antibiotics. · Try not to use your injured wrist and hand. · After you are better, do exercises to make the muscles around your tendon stronger. This can prevent the problem from coming back. Follow instructions from your doctor. When should you call for help? Call your doctor now or seek immediate medical care if: 
  · Your hand or fingers are cool or pale or change colors.  
  · You have tingling, weakness, or numbness in your hand and fingers.  
  · Your pain gets worse.  
  · The tendon may be infected. Signs of infection include: ? Increased pain and tenderness around the wrist or thumb. ? Swelling or redness of the wrist or thumb. ? A fever. Watch closely for changes in your health, and be sure to contact your doctor if: 
  · You do not get better as expected. Where can you learn more? Go to http://www.gray.com/ Enter Y130 in the search box to learn more about \"Tenosynovitis of the Wrist: Care Instructions. \" Current as of: March 2, 2020               Content Version: 12.6 © 1542-3879 [employer]. Care instructions adapted under license by [employer] (which disclaims liability or warranty for this information). If you have questions about a medical condition or this instruction, always ask your healthcare professional. [place] [id number] any warranty or liability for your use of this information. Wrist Tendinitis: Exercises Introduction Here are some examples of exercises for you to try. The exercises may be suggested for a condition or for rehabilitation. Start each exercise slowly. Ease off the exercises if you start to have pain. You will be told when to start these exercises and which ones will work best for you. How to do the exercises Wrist flexion and extension 1. Place your forearm on a table, with your hand and affected wrist extended beyond the table, palm down. 2. Bend your wrist to move your hand upward and allow your hand to close into a fist, then lower your hand and allow your fingers to relax. Hold each position for about 6 seconds. 3. Repeat 8 to 12 times. Hand flips 1. While seated, place your forearm and affected wrist on your thigh, palm down. 2. Flip your hand over so the back of your hand rests on your thigh and your palm is up. Alternate between palm up and palm down while keeping your forearm on your thigh. 3. Repeat 8 to 12 times. Wrist radial and ulnar deviation 1. Hold your affected hand out in front of you, palm down. 2. Slowly bend your wrist as far as you can from side to side. Hold each position for about 6 seconds. 3. Repeat 8 to 12 times. Wrist extensor stretch 1. Extend the arm with the affected wrist in front of you and point your fingers toward the floor. 2. With your other hand, gently bend your wrist farther until you feel a mild to moderate stretch in your forearm. 3. Hold the stretch for at least 15 to 30 seconds. 4. Repeat 2 to 4 times. 5. When you can do this stretch with ease and no pain, repeat steps 1 through 4. But this time extend your affected arm in front of you and make a fist with your palm facing down. Then bend your wrist, pointing your fist toward the floor. Wrist flexor stretch 1. Extend the arm with the affected wrist in front of you with your palm facing away from your body. 2. Bend back your wrist, pointing your hand up toward the ceiling. 3. With your other hand, gently bend your wrist farther until you feel a mild to moderate stretch in your forearm. 4. Hold the stretch for at least 15 to 30 seconds. 5. Repeat 2 to 4 times. 6. Repeat steps 1 through 5, but this time extend your affected arm in front of you with your palm facing up. Then bend back your wrist, pointing your hand toward the floor. Follow-up care is a key part of your treatment and safety. Be sure to make and go to all appointments, and call your doctor if you are having problems. It's also a good idea to know your test results and keep a list of the medicines you take. Where can you learn more? Go to http://www.gray.com/ Enter H552 in the search box to learn more about \"Wrist Tendinitis: Exercises. \" Current as of: March 2, 2020               Content Version: 12.6 © 7484-1221 [employer]. Care instructions adapted under license by [employer] (which disclaims liability or warranty for this information). If you have questions about a medical condition or this instruction, always ask your healthcare professional. [address] any warranty or liability for your use of this information.

## 2020-11-02 LAB — MAMMOGRAPHY, EXTERNAL: NORMAL

## 2020-11-09 DIAGNOSIS — I10 ESSENTIAL HYPERTENSION: ICD-10-CM

## 2020-11-09 DIAGNOSIS — E78.00 HYPERCHOLESTEREMIA: ICD-10-CM

## 2020-11-09 DIAGNOSIS — E03.9 ACQUIRED HYPOTHYROIDISM: ICD-10-CM

## 2020-11-12 RX ORDER — PRAVASTATIN SODIUM 20 MG/1
20 TABLET ORAL
Qty: 90 TAB | Refills: 3 | Status: SHIPPED | OUTPATIENT
Start: 2020-11-12 | End: 2020-11-12 | Stop reason: SDUPTHER

## 2020-11-12 RX ORDER — PRAVASTATIN SODIUM 20 MG/1
TABLET ORAL
Qty: 90 TAB | Refills: 3 | Status: SHIPPED | OUTPATIENT
Start: 2020-11-12 | End: 2021-11-20 | Stop reason: ALTCHOICE

## 2020-11-12 RX ORDER — LEVOTHYROXINE SODIUM 75 UG/1
TABLET ORAL
Qty: 45 TAB | Refills: 3 | Status: SHIPPED | OUTPATIENT
Start: 2020-11-12 | End: 2022-02-10

## 2020-11-12 RX ORDER — RAMIPRIL 2.5 MG/1
2.5 CAPSULE ORAL DAILY
Qty: 90 CAP | Refills: 3 | Status: SHIPPED | OUTPATIENT
Start: 2020-11-12 | End: 2021-11-01

## 2020-11-12 RX ORDER — LEVOTHYROXINE SODIUM 88 UG/1
TABLET ORAL
Qty: 45 TAB | Refills: 3 | Status: SHIPPED | OUTPATIENT
Start: 2020-11-12 | End: 2021-11-01

## 2020-12-28 RX ORDER — HYDROCHLOROTHIAZIDE 25 MG/1
25 TABLET ORAL DAILY
Qty: 90 TAB | Refills: 0 | Status: SHIPPED | OUTPATIENT
Start: 2020-12-28 | End: 2021-03-21 | Stop reason: SDUPTHER

## 2021-03-22 RX ORDER — HYDROCHLOROTHIAZIDE 25 MG/1
25 TABLET ORAL DAILY
Qty: 90 TAB | Refills: 1 | Status: SHIPPED | OUTPATIENT
Start: 2021-03-22 | End: 2021-09-19

## 2021-04-17 NOTE — PROGRESS NOTES
HISTORY OF PRESENT ILLNESS  Marcos Goel is a 72 y.o. female. Marcos Goel is here to follow up on their HTN. This is a chronic problem. She has a cavernous hemangioma of the brain. Neurosurgery told her that nothing needed to be done, but was told if she ever had a severe headache to go to the ER immediately. It is venous. Also, she complains of fatigue and weight gain. She has been having gastroesophageal reflux disease symptoms. In the past, she was on Prilosec for this. Review of Systems   Constitutional: Positive for malaise/fatigue. Weight gain   Eyes: Negative for blurred vision. Respiratory: Negative for shortness of breath. Cardiovascular: Negative for chest pain. Gastrointestinal: Positive for heartburn and nausea. Negative for vomiting. Neurological: Negative for dizziness, sensory change, speech change, focal weakness and headaches. Visit Vitals  /73   Pulse 69   Temp 97 °F (36.1 °C)   Resp 18   Ht 5' 6\" (1.676 m)   Wt 210 lb (95.3 kg)   SpO2 98%   BMI 33.89 kg/m²     Physical Exam  Vitals signs and nursing note reviewed. Constitutional:       General: She is not in acute distress. Appearance: She is well-developed. She is not diaphoretic. Cardiovascular:      Rate and Rhythm: Normal rate and regular rhythm. Heart sounds: Normal heart sounds. No murmur. No friction rub. No gallop. Pulmonary:      Effort: Pulmonary effort is normal. No respiratory distress. Breath sounds: Normal breath sounds. No wheezing or rales. Skin:     General: Skin is warm and dry. Neurological:      Mental Status: She is alert and oriented to person, place, and time. ASSESSMENT and PLAN    ICD-10-CM ICD-9-CM    1. Essential hypertension  P52 630.3 METABOLIC PANEL, BASIC   2. Cavernous hemangioma of brain (HCC)  D18.02 228.02    3.  Gastroesophageal reflux disease, unspecified whether esophagitis present  K21.9 530.81 REFERRAL TO GASTROENTEROLOGY omeprazole (PRILOSEC) 20 mg capsule   4. Weight gain  R63.5 783.1 TSH 3RD GENERATION   5. Fatigue, unspecified type  R53.83 780.79 TSH 3RD GENERATION   6. Menopause  Z78.0 627.2 DEXA BONE DENSITY STUDY AXIAL   7. Encounter for immunization  Z23 V03.89    8. Screen for colon cancer  Z12.11 V76.51 REFERRAL TO GASTROENTEROLOGY        Blood pressure controlled  Symptomatic gastroesophageal reflux disease  Start Prilosec  Gastroenterology referral  Labs per orders. Colonoscopy  Bone density  Call 911 if severe headache  At the end of her visit, she mentions she has been having neck pain       Follow-up and Dispositions    · Return in about 1 month (around 5/19/2021), or neck pain, AWV/2 months GERD. Reviewed plan of care. Patient has provided input and agrees with goals.

## 2021-04-19 ENCOUNTER — OFFICE VISIT (OUTPATIENT)
Dept: FAMILY MEDICINE CLINIC | Age: 66
End: 2021-04-19
Payer: MEDICARE

## 2021-04-19 VITALS
HEART RATE: 69 BPM | HEIGHT: 66 IN | TEMPERATURE: 97 F | BODY MASS INDEX: 33.75 KG/M2 | DIASTOLIC BLOOD PRESSURE: 73 MMHG | SYSTOLIC BLOOD PRESSURE: 130 MMHG | WEIGHT: 210 LBS | RESPIRATION RATE: 18 BRPM | OXYGEN SATURATION: 98 %

## 2021-04-19 DIAGNOSIS — D18.02 CAVERNOUS HEMANGIOMA OF BRAIN (HCC): ICD-10-CM

## 2021-04-19 DIAGNOSIS — Z12.11 SCREEN FOR COLON CANCER: ICD-10-CM

## 2021-04-19 DIAGNOSIS — R63.5 WEIGHT GAIN: ICD-10-CM

## 2021-04-19 DIAGNOSIS — I10 ESSENTIAL HYPERTENSION: Primary | ICD-10-CM

## 2021-04-19 DIAGNOSIS — Z23 ENCOUNTER FOR IMMUNIZATION: ICD-10-CM

## 2021-04-19 DIAGNOSIS — R53.83 FATIGUE, UNSPECIFIED TYPE: ICD-10-CM

## 2021-04-19 DIAGNOSIS — Z78.0 MENOPAUSE: ICD-10-CM

## 2021-04-19 DIAGNOSIS — K21.9 GASTROESOPHAGEAL REFLUX DISEASE, UNSPECIFIED WHETHER ESOPHAGITIS PRESENT: ICD-10-CM

## 2021-04-19 PROCEDURE — G8400 PT W/DXA NO RESULTS DOC: HCPCS | Performed by: FAMILY MEDICINE

## 2021-04-19 PROCEDURE — 1101F PT FALLS ASSESS-DOCD LE1/YR: CPT | Performed by: FAMILY MEDICINE

## 2021-04-19 PROCEDURE — G8417 CALC BMI ABV UP PARAM F/U: HCPCS | Performed by: FAMILY MEDICINE

## 2021-04-19 PROCEDURE — 1090F PRES/ABSN URINE INCON ASSESS: CPT | Performed by: FAMILY MEDICINE

## 2021-04-19 PROCEDURE — G8754 DIAS BP LESS 90: HCPCS | Performed by: FAMILY MEDICINE

## 2021-04-19 PROCEDURE — G8427 DOCREV CUR MEDS BY ELIG CLIN: HCPCS | Performed by: FAMILY MEDICINE

## 2021-04-19 PROCEDURE — G8510 SCR DEP NEG, NO PLAN REQD: HCPCS | Performed by: FAMILY MEDICINE

## 2021-04-19 PROCEDURE — 99214 OFFICE O/P EST MOD 30 MIN: CPT | Performed by: FAMILY MEDICINE

## 2021-04-19 PROCEDURE — G9899 SCRN MAM PERF RSLTS DOC: HCPCS | Performed by: FAMILY MEDICINE

## 2021-04-19 PROCEDURE — G8536 NO DOC ELDER MAL SCRN: HCPCS | Performed by: FAMILY MEDICINE

## 2021-04-19 PROCEDURE — 3017F COLORECTAL CA SCREEN DOC REV: CPT | Performed by: FAMILY MEDICINE

## 2021-04-19 PROCEDURE — G8752 SYS BP LESS 140: HCPCS | Performed by: FAMILY MEDICINE

## 2021-04-19 RX ORDER — OMEPRAZOLE 20 MG/1
20 CAPSULE, DELAYED RELEASE ORAL DAILY
Qty: 30 CAP | Refills: 1 | Status: SHIPPED | OUTPATIENT
Start: 2021-04-19 | End: 2021-05-17

## 2021-04-28 ENCOUNTER — HOSPITAL ENCOUNTER (OUTPATIENT)
Dept: MAMMOGRAPHY | Age: 66
Discharge: HOME | End: 2021-04-28
Attending: FAMILY MEDICINE
Payer: MEDICARE

## 2021-04-28 DIAGNOSIS — Z78.0 MENOPAUSE: ICD-10-CM

## 2021-04-28 PROCEDURE — 77080 DXA BONE DENSITY AXIAL: CPT

## 2021-05-17 ENCOUNTER — HOSPITAL ENCOUNTER (OUTPATIENT)
Dept: GENERAL RADIOLOGY | Age: 66
Discharge: HOME | End: 2021-05-17
Payer: MEDICARE

## 2021-05-17 ENCOUNTER — OFFICE VISIT (OUTPATIENT)
Dept: FAMILY MEDICINE CLINIC | Age: 66
End: 2021-05-17
Payer: MEDICARE

## 2021-05-17 VITALS
HEIGHT: 66 IN | RESPIRATION RATE: 18 BRPM | HEART RATE: 82 BPM | OXYGEN SATURATION: 98 % | TEMPERATURE: 97 F | BODY MASS INDEX: 33.27 KG/M2 | SYSTOLIC BLOOD PRESSURE: 119 MMHG | DIASTOLIC BLOOD PRESSURE: 68 MMHG | WEIGHT: 207 LBS

## 2021-05-17 DIAGNOSIS — M85.89 OSTEOPENIA OF MULTIPLE SITES: ICD-10-CM

## 2021-05-17 DIAGNOSIS — M54.2 NECK PAIN: ICD-10-CM

## 2021-05-17 DIAGNOSIS — M54.2 NECK PAIN: Primary | ICD-10-CM

## 2021-05-17 DIAGNOSIS — Z00.00 WELCOME TO MEDICARE PREVENTIVE VISIT: ICD-10-CM

## 2021-05-17 PROCEDURE — G8754 DIAS BP LESS 90: HCPCS | Performed by: FAMILY MEDICINE

## 2021-05-17 PROCEDURE — 3017F COLORECTAL CA SCREEN DOC REV: CPT | Performed by: FAMILY MEDICINE

## 2021-05-17 PROCEDURE — G8399 PT W/DXA RESULTS DOCUMENT: HCPCS | Performed by: FAMILY MEDICINE

## 2021-05-17 PROCEDURE — 1101F PT FALLS ASSESS-DOCD LE1/YR: CPT | Performed by: FAMILY MEDICINE

## 2021-05-17 PROCEDURE — G8510 SCR DEP NEG, NO PLAN REQD: HCPCS | Performed by: FAMILY MEDICINE

## 2021-05-17 PROCEDURE — 1090F PRES/ABSN URINE INCON ASSESS: CPT | Performed by: FAMILY MEDICINE

## 2021-05-17 PROCEDURE — G8752 SYS BP LESS 140: HCPCS | Performed by: FAMILY MEDICINE

## 2021-05-17 PROCEDURE — G0402 INITIAL PREVENTIVE EXAM: HCPCS | Performed by: FAMILY MEDICINE

## 2021-05-17 PROCEDURE — G8536 NO DOC ELDER MAL SCRN: HCPCS | Performed by: FAMILY MEDICINE

## 2021-05-17 PROCEDURE — 99214 OFFICE O/P EST MOD 30 MIN: CPT | Performed by: FAMILY MEDICINE

## 2021-05-17 PROCEDURE — G8417 CALC BMI ABV UP PARAM F/U: HCPCS | Performed by: FAMILY MEDICINE

## 2021-05-17 PROCEDURE — G8427 DOCREV CUR MEDS BY ELIG CLIN: HCPCS | Performed by: FAMILY MEDICINE

## 2021-05-17 PROCEDURE — G9899 SCRN MAM PERF RSLTS DOC: HCPCS | Performed by: FAMILY MEDICINE

## 2021-05-17 PROCEDURE — 72050 X-RAY EXAM NECK SPINE 4/5VWS: CPT

## 2021-05-17 RX ORDER — MULTIVITAMIN
1 TABLET ORAL DAILY
COMMUNITY

## 2021-05-17 RX ORDER — TIZANIDINE 2 MG/1
2 TABLET ORAL
Qty: 30 TAB | Refills: 1 | Status: SHIPPED
Start: 2021-05-17 | End: 2022-05-16

## 2021-05-17 RX ORDER — PANTOPRAZOLE SODIUM 40 MG/1
TABLET, DELAYED RELEASE ORAL
COMMUNITY
Start: 2021-05-11 | End: 2021-11-15

## 2021-05-17 NOTE — PATIENT INSTRUCTIONS
Medicare Wellness Visit, Female The best way to live healthy is to have a lifestyle where you eat a well-balanced diet, exercise regularly, limit alcohol use, and quit all forms of tobacco/nicotine, if applicable. Regular preventive services are another way to keep healthy. Preventive services (vaccines, screening tests, monitoring & exams) can help personalize your care plan, which helps you manage your own care. Screening tests can find health problems at the earliest stages, when they are easiest to treat. [facility] follows the current, evidence-based guidelines published by the [facility] ([facility]) when recommending preventive services for our patients. Because we follow these guidelines, sometimes recommendations change over time as research supports it. (For example, mammograms used to be recommended annually. Even though Medicare will still pay for an annual mammogram, the newer guidelines recommend a mammogram every two years for women of average risk). Of course, you and your doctor may decide to screen more often for some diseases, based on your risk and your co-morbidities (chronic disease you are already diagnosed with). Preventive services for you include: - Medicare offers their members a free annual wellness visit, which is time for you and your primary care provider to discuss and plan for your preventive service needs. Take advantage of this benefit every year! 
-All adults over the age of 72 should receive the recommended pneumonia vaccines. Current USPSTF guidelines recommend a series of two vaccines for the best pneumonia protection.  
-All adults should have a flu vaccine yearly and a tetanus vaccine every 10 years.  
-All adults age 48 and older should receive the shingles vaccines (series of two vaccines).      
-All adults age 38-68 who are overweight should have a diabetes screening test once every three years.  
-All adults born between [birth year] and 1965 should be screened once for Hepatitis C. 
-Other screening tests and preventive services for persons with diabetes include: an eye exam to screen for diabetic retinopathy, a kidney function test, a foot exam, and stricter control over your cholesterol.  
-Cardiovascular screening for adults with routine risk involves an electrocardiogram (ECG) at intervals determined by your doctor.  
-Colorectal cancer screenings should be done for adults age 54-65 with no increased risk factors for colorectal cancer. There are a number of acceptable methods of screening for this type of cancer. Each test has its own benefits and drawbacks. Discuss with your doctor what is most appropriate for you during your annual wellness visit. The different tests include: colonoscopy (considered the best screening method), a fecal occult blood test, a fecal DNA test, and sigmoidoscopy. 
 
-A bone mass density test is recommended when a woman turns 65 to screen for osteoporosis. This test is only recommended one time, as a screening. Some providers will use this same test as a disease monitoring tool if you already have osteoporosis. -Breast cancer screenings are recommended every other year for women of normal risk, age 54-69. 
-Cervical cancer screenings for women over age 72 are only recommended with certain risk factors. Here is a list of your current Health Maintenance items (your personalized list of preventive services) with a due date: 
Health Maintenance Due Topic Date Due  Pneumococcal Vaccine (1 of 1 - PPSV23) Never done  Colorectal Screening  03/08/2021 Neck Spasm: Exercises Introduction Here are some examples of exercises for you to try. The exercises may be suggested for a condition or for rehabilitation. Start each exercise slowly. Ease off the exercises if you start to have pain. You will be told when to start these exercises and which ones will work best for you. How to do the exercises Levator scapula stretch 1. Sit in a firm chair, or stand up straight. 2. Gently tilt your head toward your left shoulder. 3. Turn your head to look down into your armpit, bending your head slightly forward. Let the weight of your head stretch your neck muscles. 4. Hold for 15 to 30 seconds. 5. Return to your starting position. 6. Follow the same instructions above, but tilt your head toward your right shoulder. 7. Repeat 2 to 4 times toward each shoulder. Upper trapezius stretch 1. Sit in a firm chair, or stand up straight. 2. This stretch works best if you keep your shoulder down as you lean away from it. To help you remember to do this, start by relaxing your shoulders and lightly holding on to your thighs or your chair. 3. Tilt your head toward your shoulder and hold for 15 to 30 seconds. Let the weight of your head stretch your muscles. 4. If you would like a little added stretch, place your arm behind your back. Use the arm opposite of the direction you are tilting your head. For example, if you are tilting your head to the left, place your right arm behind your back. 5. Repeat 2 to 4 times toward each shoulder. Neck rotation 1. Sit in a firm chair, or stand up straight. 2. Keeping your chin level, turn your head to the right, and hold for 15 to 30 seconds. 3. Turn your head to the left, and hold for 15 to 30 seconds. 4. Repeat 2 to 4 times to each side. Chin tuck 1. Lie on the floor with a rolled-up towel under your neck. Your head should be touching the floor. 2. Slowly bring your chin toward the front of your neck. 3. Hold for a count of 6, and then relax for up to 10 seconds. 4. Repeat 8 to 12 times. Forward neck flexion 1. Sit in a firm chair, or stand up straight. 2. Bend your head forward. 3. Hold for 15 to 30 seconds, then return to your starting position. 4. Repeat 2 to 4 times.    
Follow-up care is a key part of your treatment and safety. Be sure to make and go to all appointments, and call your doctor if you are having problems. It's also a good idea to know your test results and keep a list of the medicines you take. Where can you learn more? Go to http://www.gray.com/ Enter P962 in the search box to learn more about \"Neck Spasm: Exercises. \" Current as of: November 16, 2020               Content Version: 12.8 © 2006-2021 [employer]. Care instructions adapted under license by [employer] (which disclaims liability or warranty for this information). If you have questions about a medical condition or this instruction, always ask your healthcare professional. [address] any warranty or liability for your use of this information. Preventing Osteoporosis: Care Instructions Your Care Instructions Osteoporosis means the bones are weak and thin enough that they can break easily. The older you are, the more likely you are to get osteoporosis. But with plenty of calcium, vitamin D, and exercise, you can help prevent osteoporosis. The preteen and teen years are a key time for bone building. With the help of calcium, vitamin D, and exercise in those early years and beyond, the bones reach their peak density and strength by age 27. After age 27, your bones naturally start to thin and weaken. The stronger your bones are at around age 27, the lower your risk for osteoporosis. But no matter what your age and risk are, your bones still need calcium, vitamin D, and exercise to stay strong. Also avoid smoking, and limit alcohol. Smoking and heavy alcohol use can make your bones thinner. Talk to your doctor about any special risks you might have, such as having a close relative with osteoporosis or taking a medicine that can weaken bones. Your doctor can tell you the best ways to protect your bones from thinning.  
Follow-up care is a key part of your treatment and safety. Be sure to make and go to all appointments, and call your doctor if you are having problems. It's also a good idea to know your test results and keep a list of the medicines you take. How can you care for yourself at home? Get enough calcium and vitamin D. 
· Eat foods rich in calcium, like yogurt, cheese, milk, and dark green vegetables. · Eat foods rich in vitamin D, like eggs, fatty fish, cereal, and fortified milk. · Get some sunshine. Your body uses sunshine to make its own vitamin D. 
· Talk to your doctor about taking a calcium plus vitamin D supplement if you don't think you are getting enough through your diet and sunshine. · Get regular bone-building exercise. Walking, jogging, dancing, and lifting weights can make your bones stronger. · Limit alcohol. Drink no more than 1 alcohol drink a day if you are a woman. Drink no more than 2 alcohol drinks a day if you are a man. · Do not smoke. Smoking can make bones thin faster. If you need help quitting, talk to your doctor about stop-smoking programs and medicines. These can increase your chances of quitting for good. When should you call for help? Watch closely for changes in your health, and be sure to contact your doctor if you have any problems. Where can you learn more? Go to http://www.gray.com/ Enter Y651 in the search box to learn more about \"Preventing Osteoporosis: Care Instructions. \" Current as of: December 7, 2020               Content Version: 12.8 © 2006-2021 Healthwise, Incorporated. Care instructions adapted under license by [employer] (which disclaims liability or warranty for this information). If you have questions about a medical condition or this instruction, always ask your healthcare professional. [address] any warranty or liability for your use of this information.

## 2021-05-17 NOTE — PROGRESS NOTES
Chief Complaint   Patient presents with   [employer] Annual Wellness Visit     AWV CONCERN FOR BONE DENSITY SCAN      \

## 2021-05-17 NOTE — PROGRESS NOTES
HISTORY OF PRESENT ILLNESS  Jose Mom is a 72 y.o. female. Patient presents with: Annual Wellness Visit: AWV CONCERN FOR BONE DENSITY SCAN     She wants to discuss her recent bone density. It read: This patient is osteopenic using the World Health Organization criteria  10 year probability of major osteoporotic fracture: 7.1%  10 year probability of hip fracture: 0.4%    She 2 1/2 to 3 1/2 miles 5 days a week. There is very little calcium in her diet. No history of fractures. Also, she has been having neck tightness/aching for years that is getting worse. Review of Systems   Constitutional: Negative for chills and fever. Musculoskeletal: Positive for neck pain. Neurological: Negative for tingling, sensory change and focal weakness. Visit Vitals  /68   Pulse 82   Temp 97 °F (36.1 °C) (Tympanic)   Resp 18   Ht 5' 6\" (1.676 m)   Wt 207 lb (93.9 kg)   SpO2 98%   BMI 33.41 kg/m²     Physical Exam  Constitutional:       General: She is not in acute distress. Appearance: She is well-developed. She is not diaphoretic. Musculoskeletal:      Cervical back: She exhibits decreased range of motion, tenderness, pain and spasm. She exhibits no bony tenderness, no swelling and no deformity. Comments: There is tenderness over the lateral and posterior aspects of the neck. Mild tenderness over C5 and C6. Skin:     General: Skin is warm and dry. Neurological:      Mental Status: She is alert and oriented to person, place, and time. Sensory: No sensory deficit. Deep Tendon Reflexes:      Reflex Scores:       Tricep reflexes are 2+ on the right side and 2+ on the left side. Bicep reflexes are 2+ on the right side and 2+ on the left side. Brachioradialis reflexes are 2+ on the right side and 2+ on the left side. ASSESSMENT and PLAN    ICD-10-CM ICD-9-CM    1. Neck pain  M54.2 723.1 XR SPINE CERV 4 OR 5 V      tiZANidine (ZANAFLEX) 2 mg tablet   2. Osteopenia of multiple sites  M85.89 733.90 calcium-cholecalciferol, D3, (CALTRATE 600+D) tablet       C spine x-ray  Heat, Zanaflex prn  Neck exercises given  Continue weight bearing exercise  Calcium with D BID  Bone density in 2 years    Follow-up and Dispositions    · Return in about 6 months (around 11/17/2021) for blood pressure. Reviewed plan of care. Patient has provided input and agrees with goals. This is an Initial Medicare Annual Wellness Exam (AWV) (Performed 12 months after IPPE or effective date of Medicare Part B enrollment, Once in a lifetime)    I have reviewed the patient's medical history in detail and updated the computerized patient record. Assessment/Plan   Education and counseling provided:  Are appropriate based on today's review and evaluation  Advance Care Plan or Surrogate Decision Maker documented in medical record. 1. Neck pain  -     XR SPINE CERV 4 OR 5 V; Future  -     tiZANidine (ZANAFLEX) 2 mg tablet; Take 1 Tab by mouth three (3) times daily as needed for Muscle Spasm(s) or Pain., Normal, Disp-30 Tab, R-1  2. Osteopenia of multiple sites  3. Welcome to Medicare preventive visit       Depression Risk Factor Screening     3 most recent PHQ Screens 5/17/2021   Little interest or pleasure in doing things Not at all   Feeling down, depressed, irritable, or hopeless Not at all   Total Score PHQ 2 0       Alcohol Risk Screen   AUDIT Screen Score: AUDIT Score: 0      Document Brief Intervention (corresponds directly with the 5 A's, Ask, Advise, Assess, Assist, and Arrange):  NA    At- Risk Patients (Score 7-15 for women; 8-15 for men)  Discuss concern patient is drinking at unhealthy levels known to increase risk of alcohol-related health problems. Is Patient ready to commit to change?  NA    If No:   Encourage reflection   Discuss short term and long term health risks of consuming alcohol   Barriers to change   Reaffirm willingness to help / Educational materials provided  If Yes:   Set goal  Cart[employer] provided    Harmful use or Dependence (Score 16 or greater)   Discuss short term and long term health risks of consuming alcohol   Recommendations   Negotiate drinking goal   Recommend addiction specialist/center   Arrange follow-up appointments. Functional Ability and Level of Safety    Hearing: Hearing is good. Activities of Daily Living:  ADL Assessment 5/17/2021   Feeding yourself No Help Needed   Getting from bed to chair No Help Needed   Getting dressed No Help Needed   Bathing or showering No Help Needed   Walk across the room (includes cane/walker) No Help Needed   Using the telphone No Help Needed   Taking your medications No Help Needed   Preparing meals No Help Needed   Managing money (expenses/bills) No Help Needed   Moderately strenuous housework (laundry) No Help Needed   Shopping for personal items (toiletries/medicines) No Help Needed   Shopping for groceries No Help Needed   Driving No Help Needed   Climbing a flight of stairs No Help Needed   Getting to places beyond walking distances No Help Needed          Ambulation: with no difficulty      Fall Risk:  Fall Risk Assessment, last 12 mths 5/17/2021   Able to walk? Yes   Fall in past 12 months? 0   Do you feel unsteady? 0   Are you worried about falling 0      Abuse Screen:  Abuse Screening Questionnaire 5/17/2021   Do you ever feel afraid of your partner? N   Are you in a relationship with someone who physically or mentally threatens you? N   Is it safe for you to go home?  Y            Cognitive Screening    Has your family/caregiver stated any concerns about your memory: no     Cognitive Screening: Normal - Verbal Fluency Test    Health Maintenance Due     Health Maintenance Due   Topic Date Due    Pneumococcal 65+ years (1 of 1 - PPSV23) Never done    Colorectal Cancer Screening Combo  03/08/2021       Patient Care Team   Patient Care Team:  Ramón Villaseñor MD as PCP - General (Family Medicine)  Ramón Villaseñor MD as PCP - [facility]  Nano Chino MD (Gastroenterology)  Radha Edouard MD (Obstetrics & Gynecology)    History     Patient Active Problem List   Diagnosis Code    GERD (gastroesophageal reflux disease) K21.9    Hypothyroidism E03.9    Hypercholesteremia E78.00    Lumbar disc disease M51.9    Cavernous hemangioma of brain ([address] D18.02    Pulmonary nodule R91.1    History of seizure Z87.898    Essential hypertension I10    Allergic rhinitis due to dust mite J30.89    High coronary artery calcium score R93.1     Past Medical History:   Diagnosis Date    Allergic rhinitis due to dust mite 3/8/2018    Cavernous hem[address]oma of [address] ([facility] [address]     GERD (gastroesophageal reflux disease)     High coronary artery calcium score 8/5/2019    History of seizure     once, 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 90 Tab 1    levothyroxine (SYNTHROID) 88 mcg tablet TAKE 1 TABLET ON AN EMPTY STOMACH, IN THE MORNING EVERY OTHER DAY ALTERNATING WITH 75 MCG 45 Tab 3    ramipriL (ALTACE) 2.5 mg capsule Take 1 Cap by mouth daily. 90 Cap 3    levothyroxine (SYNTHROID) 75 mcg tablet TAKE ONE TABLET BY MOUTH EVERY OTHER DAY  **ALTERNATING WITH 88MCG** 45 Tab 3    pravastatin (PRAVACHOL) 20 mg tablet TAKE 1 TABLET BY MOUTH EVERY EVENING 90 Tab 3    ibuprofen (MOTRIN) 600 mg tablet Take 1 Tab by mouth every six (6) hours as needed for Pain. 30 Tab 1    melatonin 5 mg cap capsule Take 5 mg by mouth nightly.  albuterol (PROAIR HFA) 90 mcg/actuation inhaler Take 2 Puffs by inhalation every four (4) hours as needed for Wheezing. 1 Inhaler 0    co-enzyme Q-10 (CO Q-10) 100 mg capsule Take 100 mg by mouth daily.  multivitamin (ONE A DAY) tablet Take 1 Tab by mouth daily.  aspirin delayed-release (ASPIR-81) 81 mg tablet Take 1 Tab by mouth daily.       pantoprazole (PROTONIX) 40 mg tablet        Allergies   Allergen Reactions    Sulfa (Sulfonamide Antibiotics) Seizures       Family History   Problem Relation Age of Onset    Cancer Father         lung    Thyroid Disease Mother     Dementia Mother     Hypertension Mother     No Known Problems Brother      Social History     Tobacco Use    Smoking status: Former Smoker     Packs/day: 1.00     Quit date: 2007     Years since quittin.2    Smokeless tobacco: Never Used   Substance Use Topics    Alcohol use: Yes     Comment: rarely       Deena Ocasio MD

## 2021-05-18 ENCOUNTER — TELEPHONE (OUTPATIENT)
Dept: FAMILY MEDICINE CLINIC | Age: 66
End: 2021-05-18

## 2021-05-19 NOTE — TELEPHONE ENCOUNTER
Please call patient and let her know her x-ray shows osteoarthritis. She needs to use heat, Zanaflex and do the exercises as we discussed. Angeli Maza

## 2021-05-19 NOTE — TELEPHONE ENCOUNTER
Called and spoke with pt, and she has been advises and states understanding of results and agrees with plan.

## 2021-07-25 DIAGNOSIS — K21.9 GASTROESOPHAGEAL REFLUX DISEASE, UNSPECIFIED WHETHER ESOPHAGITIS PRESENT: ICD-10-CM

## 2021-07-29 RX ORDER — OMEPRAZOLE 20 MG/1
CAPSULE, DELAYED RELEASE ORAL
Qty: 90 CAPSULE | Refills: 3 | OUTPATIENT
Start: 2021-07-29

## 2021-07-29 RX ORDER — OMEPRAZOLE 20 MG/1
20 CAPSULE, DELAYED RELEASE ORAL DAILY
Qty: 90 CAPSULE | Refills: 3 | OUTPATIENT
Start: 2021-07-29

## 2021-07-29 NOTE — TELEPHONE ENCOUNTER
----- Message from Aden Eisenmenger sent at 7/29/2021 11:02 AM EDT -----  Regarding: Dr Hackett   Medication Refill    Caller (if not patient): pt      Relationship of caller (if not patient):pt      Best contact number(s): [phone]      Name of medication and dosage if known:Omeprazole 20 mg Pantoprazole 40 mg      Is patient out of this medication (yes/no):no 4 pills      Pharmacy name: [id number] [address] [facility] listed in chart? (yes/no):yes  Pharmacy phone [phone]      Details to clarify the request:Pt is requesting a refill for Omeprazole 20 mg to be called to the pharmacy on file. Pt advised that the original request was denied and inquires for a reason. Pt advised that she no longer takes the Pantoprazole 40 mg due to side effects.  Pt is requesting a call if requeired      Aden Eisenmenger

## 2021-07-29 NOTE — TELEPHONE ENCOUNTER
Please see message regarding Protonix prior to viewing refill on omeprazole. Pt unable to tolerate Protonix.  Ldm

## 2021-08-19 LAB — PAP SMEAR, EXTERNAL: NORMAL

## 2021-09-16 ENCOUNTER — OFFICE VISIT (OUTPATIENT)
Dept: CARDIOLOGY CLINIC | Age: 66
End: 2021-09-16
Payer: MEDICARE

## 2021-09-16 VITALS
BODY MASS INDEX: 33.11 KG/M2 | HEART RATE: 53 BPM | OXYGEN SATURATION: 100 % | RESPIRATION RATE: 18 BRPM | SYSTOLIC BLOOD PRESSURE: 140 MMHG | DIASTOLIC BLOOD PRESSURE: 78 MMHG | WEIGHT: 206 LBS | HEIGHT: 66 IN

## 2021-09-16 DIAGNOSIS — I10 ESSENTIAL HYPERTENSION: ICD-10-CM

## 2021-09-16 DIAGNOSIS — R93.1 HIGH CORONARY ARTERY CALCIUM SCORE: Primary | ICD-10-CM

## 2021-09-16 DIAGNOSIS — E78.00 HYPERCHOLESTEREMIA: ICD-10-CM

## 2021-09-16 PROCEDURE — 3017F COLORECTAL CA SCREEN DOC REV: CPT | Performed by: INTERNAL MEDICINE

## 2021-09-16 PROCEDURE — 99214 OFFICE O/P EST MOD 30 MIN: CPT | Performed by: INTERNAL MEDICINE

## 2021-09-16 PROCEDURE — G8432 DEP SCR NOT DOC, RNG: HCPCS | Performed by: INTERNAL MEDICINE

## 2021-09-16 PROCEDURE — G8417 CALC BMI ABV UP PARAM F/U: HCPCS | Performed by: INTERNAL MEDICINE

## 2021-09-16 PROCEDURE — G8754 DIAS BP LESS 90: HCPCS | Performed by: INTERNAL MEDICINE

## 2021-09-16 PROCEDURE — 1090F PRES/ABSN URINE INCON ASSESS: CPT | Performed by: INTERNAL MEDICINE

## 2021-09-16 PROCEDURE — G0463 HOSPITAL OUTPT CLINIC VISIT: HCPCS | Performed by: INTERNAL MEDICINE

## 2021-09-16 PROCEDURE — G8428 CUR MEDS NOT DOCUMENT: HCPCS | Performed by: INTERNAL MEDICINE

## 2021-09-16 PROCEDURE — G9899 SCRN MAM PERF RSLTS DOC: HCPCS | Performed by: INTERNAL MEDICINE

## 2021-09-16 PROCEDURE — 1101F PT FALLS ASSESS-DOCD LE1/YR: CPT | Performed by: INTERNAL MEDICINE

## 2021-09-16 PROCEDURE — G8753 SYS BP > OR = 140: HCPCS | Performed by: INTERNAL MEDICINE

## 2021-09-16 PROCEDURE — G8399 PT W/DXA RESULTS DOCUMENT: HCPCS | Performed by: INTERNAL MEDICINE

## 2021-09-16 PROCEDURE — G8536 NO DOC ELDER MAL SCRN: HCPCS | Performed by: INTERNAL MEDICINE

## 2021-09-16 RX ORDER — OMEPRAZOLE 40 MG/1
CAPSULE, DELAYED RELEASE ORAL
COMMUNITY
Start: 2021-08-29

## 2021-09-16 NOTE — PROGRESS NOTES
Chief Complaint   Patient presents with    Follow-up     1 yr; chol, htn     Visit Vitals  BP (!) 140/78   Pulse (!) 53   Resp 18   Ht 5' 6\" (1.676 m)   Wt 206 lb (93.4 kg)   SpO2 100%   BMI 33.25 kg/m²     Chest pain denied   Palpations denied  SOB denied  Dizziness denied  Swelling in hands/feet denied   Recent hospital stays denied

## 2021-09-16 NOTE — PROGRESS NOTES
Office Follow-up    NAME: Mahsa Doan   :  1955  MRM:  [id number]    Date:  2021            Assessment:     Problem List  Date Reviewed: 2021        Codes Class Noted    Osteopenia of multiple sites ICD-10-CM: M85.89  ICD-9-CM: 733.90  2021        High coronary artery calcium score ICD-10-CM: R93.1  ICD-9-CM: 414.00  2019        GERD (gastroesophageal reflux disease) ICD-10-CM: K21.9  ICD-9-CM: 530.81  Unknown        Hypothyroidism ICD-10-CM: E03.9  ICD-9-CM: 244.9  Unknown        Hypercholesteremia ICD-10-CM: E78.00  ICD-9-CM: 272.0  Unknown        Lumbar disc disease ICD-10-CM: M51.9  ICD-9-CM: 722.93  Unknown        Cavernous hemangioma of brain ([facility] ICD-10-CM: D18.02  ICD-9-CM: 228.02  Unknown        Pulmonary nodule ICD-10-CM: R91.1  ICD-9-CM: 793.11  Unknown    Overview Signed 3/8/2018 10:42 AM by Michael Edwards MD     stable, last CT around              History of seizure ICD-10-CM: Z87.898  ICD-9-CM: V12.49  Unknown    Overview Signed 3/8/2018 10:48 AM by Michael Edwards MD     once, 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 Positive coronary artery calcium score: Stopped Zetia due to GI side effects. Continue Pravachol. Continue watching the diet. Continue increasing activities and losing weight. 2. CAD by CAC (2019): CAC of 93. Continue ASA, Pravachol. 3. Hypertension: Blood pressure is controlled. Continue Ramipril and HCTZ. Monitor salt intake. Increase physical activity. 4. Dyslipidemia: As above. Recheck Lipids. Diet and Pravachol. Cannot tolerate Zetia or Lipitor. 5. Bradycardia: Continue to monitor. 6. See Dr. Yanni Dawn in 1 year. Subjective:     Mahsa Doan, a 72y.o. year-old who presents for followup. She is returning today for follow-up.   She had a coronary calcium score which was at 80 with majority distribution in the LAD. She was not able to tolerate Zetia due to GI sideeffects. No symptoms of chestpain, sob, palpitations. Exam:     Physical Exam:  Visit Vitals  BP (!) 140/78   Pulse (!) 53   Resp 18   Ht 5' 6\" (1.676 m)   Wt 206 lb (93.4 kg)   SpO2 100%   BMI 33.25 kg/m²     General appearance - alert, well appearing, and in no distress  Mental status - affect appropriate to mood  Eyes - sclera anicteric, moist mucous membranes  Neck - supple, no significant adenopathy  Chest - clear to auscultation, no wheezes, rales or rhonchi      Medications:     Current Outpatient Medications   Medication Sig    omeprazole (PRILOSEC) 40 mg capsule     tiZANidine (ZANAFLEX) 2 mg tablet Take 1 Tab by mouth three (3) times daily as needed for Muscle Spasm(s) or Pain.  calcium-cholecalciferol, D3, (CALTRATE 600+D) tablet Take 1 Tab by mouth two (2) times a day.  hydroCHLOROthiazide (HYDRODIURIL) 25 mg tablet Take 1 Tab by mouth daily.  levothyroxine (SYNTHROID) 88 mcg tablet TAKE 1 TABLET ON AN EMPTY STOMACH, IN THE MORNING EVERY OTHER DAY ALTERNATING WITH 75 MCG    ramipriL (ALTACE) 2.5 mg capsule Take 1 Cap by mouth daily.  levothyroxine (SYNTHROID) 75 mcg tablet TAKE ONE TABLET BY MOUTH EVERY OTHER DAY  **ALTERNATING WITH 88MCG**    pravastatin (PRAVACHOL) 20 mg tablet TAKE 1 TABLET BY MOUTH EVERY EVENING    melatonin 5 mg cap capsule Take 5 mg by mouth nightly.  albuterol (PROAIR HFA) 90 mcg/actuation inhaler Take 2 Puffs by inhalation every four (4) hours as needed for Wheezing.  co-enzyme Q-10 (CO Q-10) 100 mg capsule Take 100 mg by mouth daily.  multivitamin (ONE A DAY) tablet Take 1 Tab by mouth daily.  aspirin delayed-release (ASPIR-81) 81 mg tablet Take 1 Tab by mouth daily.     pantoprazole (PROTONIX) 40 mg tablet  (Patient not taking: Reported on 9/16/2021)    ibuprofen (MOTRIN) 600 mg tablet Take 1 Tab by mouth every six (6) hours as needed for Pain. (Patient not taking: Reported on 9/16/2021)     No current facility-administered medications for this visit. Diagnostic Data Review:       No specialty comments available. Lab Review:     Lab Results   Component Value Date/Time    Cholesterol, total 170 10/08/2020 08:10 AM    HDL Cholesterol 54 10/08/2020 08:10 AM    LDL, calculated 94 10/08/2020 08:10 AM    LDL, calculated 67 12/05/2019 08:49 AM    Triglyceride 125 10/08/2020 08:10 AM     Lab Results   Component Value Date/Time    Creatinine 0.82 04/19/2021 12:50 PM     Lab Results   Component Value Date/Time    BUN 14 04/19/2021 12:50 PM     Lab Results   Component Value Date/Time    Potassium 3.8 04/19/2021 12:50 PM     No results found for: HBA1C, PKF1NUFO, OBA7DWLZ  No results found for: HGBPOC, HGB, HGBP, HGBEXT, HGBEXT  No results found for: PLT, PLTEXT, PLTEXT  No results for input(s): CPK, CKMB, TROIQ in the last 72 hours. No lab exists for component: CKQMB, CPKMB             ___________________________________________________    Vincent Pengign.  Ayaan Rutledge MD, [facility] [place]

## 2021-09-19 RX ORDER — HYDROCHLOROTHIAZIDE 25 MG/1
TABLET ORAL
Qty: 90 TABLET | Refills: 0 | Status: SHIPPED | OUTPATIENT
Start: 2021-09-19 | End: 2021-12-12

## 2021-10-02 NOTE — PERIOP NOTES
Informed patient of COVID requirements, patient to completed COVID curbside testing at [facility] Monday, October 4th between [id number]. Patient verbalized understanding that COVID test is required to proceed with procedure.

## 2021-10-04 ENCOUNTER — HOSPITAL ENCOUNTER (OUTPATIENT)
Dept: LAB | Age: 66
Discharge: HOME | End: 2021-10-04
Payer: MEDICARE

## 2021-10-04 ENCOUNTER — TRANSCRIBE ORDER (OUTPATIENT)
Dept: REGISTRATION | Age: 66
End: 2021-10-04

## 2021-10-04 DIAGNOSIS — Z01.812 PRE-PROCEDURAL LABORATORY EXAMINATIONS: Primary | ICD-10-CM

## 2021-10-04 DIAGNOSIS — Z01.812 PRE-PROCEDURAL LABORATORY EXAMINATIONS: ICD-10-CM

## 2021-10-04 PROCEDURE — U0005 INFEC AGEN DETEC AMPLI PROBE: HCPCS

## 2021-10-05 LAB
SARS-COV-2, XPLCVT: NOT DETECTED
SOURCE, COVRS: NORMAL

## 2021-10-07 ENCOUNTER — HOSPITAL ENCOUNTER (OUTPATIENT)
Age: 66
Setting detail: OUTPATIENT SURGERY
Discharge: HOME | End: 2021-10-07
Attending: INTERNAL MEDICINE | Admitting: INTERNAL MEDICINE
Payer: MEDICARE

## 2021-10-07 ENCOUNTER — ANESTHESIA EVENT (OUTPATIENT)
Dept: ENDOSCOPY | Age: 66
End: 2021-10-07
Payer: MEDICARE

## 2021-10-07 ENCOUNTER — ANESTHESIA (OUTPATIENT)
Dept: ENDOSCOPY | Age: 66
End: 2021-10-07
Payer: MEDICARE

## 2021-10-07 VITALS
TEMPERATURE: 97.8 F | BODY MASS INDEX: 33.13 KG/M2 | OXYGEN SATURATION: 99 % | RESPIRATION RATE: 13 BRPM | DIASTOLIC BLOOD PRESSURE: 75 MMHG | SYSTOLIC BLOOD PRESSURE: 117 MMHG | WEIGHT: 206.13 LBS | HEART RATE: 57 BPM | HEIGHT: 66 IN

## 2021-10-07 PROCEDURE — 74011250636 HC RX REV CODE- 250/636: Performed by: NURSE ANESTHETIST, CERTIFIED REGISTERED

## 2021-10-07 PROCEDURE — 76060000031 HC ANESTHESIA FIRST 0.5 HR: Performed by: INTERNAL MEDICINE

## 2021-10-07 PROCEDURE — 76040000019: Performed by: INTERNAL MEDICINE

## 2021-10-07 PROCEDURE — 74011000250 HC RX REV CODE- 250: Performed by: NURSE ANESTHETIST, CERTIFIED REGISTERED

## 2021-10-07 PROCEDURE — 77030021593 HC FCPS BIOP ENDOSC BSC -A: Performed by: INTERNAL MEDICINE

## 2021-10-07 PROCEDURE — 88305 TISSUE EXAM BY PATHOLOGIST: CPT

## 2021-10-07 PROCEDURE — 2709999900 HC NON-CHARGEABLE SUPPLY: Performed by: INTERNAL MEDICINE

## 2021-10-07 RX ORDER — EPINEPHRINE 0.1 MG/ML
1 INJECTION INTRACARDIAC; INTRAVENOUS
Status: DISCONTINUED | OUTPATIENT
Start: 2021-10-07 | End: 2021-10-07 | Stop reason: HOSPADM

## 2021-10-07 RX ORDER — SODIUM CHLORIDE 9 MG/ML
INJECTION, SOLUTION INTRAVENOUS
Status: DISCONTINUED | OUTPATIENT
Start: 2021-10-07 | End: 2021-10-07 | Stop reason: HOSPADM

## 2021-10-07 RX ORDER — PROPOFOL 10 MG/ML
INJECTION, EMULSION INTRAVENOUS
Status: DISCONTINUED | OUTPATIENT
Start: 2021-10-07 | End: 2021-10-07 | Stop reason: HOSPADM

## 2021-10-07 RX ORDER — ATROPINE SULFATE 0.1 MG/ML
0.4 INJECTION INTRAVENOUS
Status: DISCONTINUED | OUTPATIENT
Start: 2021-10-07 | End: 2021-10-07 | Stop reason: HOSPADM

## 2021-10-07 RX ORDER — SODIUM CHLORIDE 9 MG/ML
50 INJECTION, SOLUTION INTRAVENOUS CONTINUOUS
Status: DISCONTINUED | OUTPATIENT
Start: 2021-10-07 | End: 2021-10-07 | Stop reason: HOSPADM

## 2021-10-07 RX ORDER — DEXTROMETHORPHAN/PSEUDOEPHED 2.5-7.5/.8
1.2 DROPS ORAL
Status: DISCONTINUED | OUTPATIENT
Start: 2021-10-07 | End: 2021-10-07 | Stop reason: HOSPADM

## 2021-10-07 RX ORDER — FLUMAZENIL 0.1 MG/ML
0.2 INJECTION INTRAVENOUS
Status: DISCONTINUED | OUTPATIENT
Start: 2021-10-07 | End: 2021-10-07 | Stop reason: HOSPADM

## 2021-10-07 RX ORDER — NALOXONE HYDROCHLORIDE 0.4 MG/ML
0.4 INJECTION, SOLUTION INTRAMUSCULAR; INTRAVENOUS; SUBCUTANEOUS
Status: DISCONTINUED | OUTPATIENT
Start: 2021-10-07 | End: 2021-10-07 | Stop reason: HOSPADM

## 2021-10-07 RX ORDER — PROPOFOL 10 MG/ML
INJECTION, EMULSION INTRAVENOUS AS NEEDED
Status: DISCONTINUED | OUTPATIENT
Start: 2021-10-07 | End: 2021-10-07 | Stop reason: HOSPADM

## 2021-10-07 RX ORDER — MIDAZOLAM HYDROCHLORIDE 1 MG/ML
.25-5 INJECTION, SOLUTION INTRAMUSCULAR; INTRAVENOUS
Status: DISCONTINUED | OUTPATIENT
Start: 2021-10-07 | End: 2021-10-07 | Stop reason: HOSPADM

## 2021-10-07 RX ORDER — GLYCOPYRROLATE 0.2 MG/ML
INJECTION INTRAMUSCULAR; INTRAVENOUS AS NEEDED
Status: DISCONTINUED | OUTPATIENT
Start: 2021-10-07 | End: 2021-10-07 | Stop reason: HOSPADM

## 2021-10-07 RX ORDER — LIDOCAINE HYDROCHLORIDE 20 MG/ML
INJECTION, SOLUTION EPIDURAL; INFILTRATION; INTRACAUDAL; PERINEURAL AS NEEDED
Status: DISCONTINUED | OUTPATIENT
Start: 2021-10-07 | End: 2021-10-07 | Stop reason: HOSPADM

## 2021-10-07 RX ADMIN — SODIUM CHLORIDE: 9 INJECTION, SOLUTION INTRAVENOUS at 07:35

## 2021-10-07 RX ADMIN — PROPOFOL 50 MG: 10 INJECTION, EMULSION INTRAVENOUS at 08:15

## 2021-10-07 RX ADMIN — GLYCOPYRROLATE 0.2 MG: 0.2 INJECTION INTRAMUSCULAR; INTRAVENOUS at 08:11

## 2021-10-07 RX ADMIN — PROPOFOL 50 MG: 10 INJECTION, EMULSION INTRAVENOUS at 08:12

## 2021-10-07 RX ADMIN — PROPOFOL INJECTABLE EMULSION 125 MCG/KG/MIN: 10 INJECTION, EMULSION INTRAVENOUS at 08:12

## 2021-10-07 RX ADMIN — PROPOFOL 40 MG: 10 INJECTION, EMULSION INTRAVENOUS at 08:19

## 2021-10-07 RX ADMIN — LIDOCAINE HYDROCHLORIDE 100 MG: 20 INJECTION, SOLUTION INTRAVENOUS at 08:12

## 2021-10-07 NOTE — PROCEDURES
Milka Garza M.D.  [phone]            10/7/2021          Colonoscopy Operative Report  Chrao Rowe  :  1955  [facility] Medical Record Number:  [id number]      Indications:    Screening colonoscopy     :  Jose Miguel Puga MD    Referring Provider: Grupo Nelson MD    Sedation:  MAC anesthesia    Pre-Procedural Exam:      Airway: clear,  No airway problems anticipated  Heart: RRR, without gallops or rubs  Lungs: clear bilaterally without wheezes, crackles, or rhonchi  Abdomen: soft, nontender, nondistended, bowel sounds present  Mental Status: awake, alert and oriented to person, place and time     Procedure Details:  After informed consent was obtained with all risks and benefits of procedure explained and preoperative exam completed, the patient was taken to the endoscopy suite and placed in the left lateral decubitus position. Upon sequential sedation as per above, a digital rectal exam was performed. The Olympus videocolonoscope  was inserted in the rectum and carefully advanced to the cecum, which was identified by the ileocecal valve and appendiceal orifice. The quality of preparation was excellent. The colonoscope was slowly withdrawn with careful inspection and evaluation between folds. Retroflexion in the rectum was performed. Findings:   Terminal Ileum: normal  Cecum: normal  Ascending Colon: normal  Transverse Colon: normal  Descending Colon: normal  Sigmoid: normal  Rectum: no mucosal lesion appreciated  Grade 1 internal hemorrhoid(s); Interventions:  none    Specimen Removed:  none    Complications: None. EBL:  None. Impression:  Small internal hemorrhoids, otherwise mucosa within normal    Recommendations:  -Repeat colonoscopy in 10 years   -High fiber diet.    -Resume normal medication(s). Discharge Disposition:  Home in the company of a  when able to ambulate.     Jose Miguel Puga MD  10/7/2021  8:41 AM

## 2021-10-07 NOTE — H&P
The patient is a 72year old female who presents with a complaint of GERD. Patient is a 72year old female who presents today for evaluation of GERD and possible colonoscopy. She has had reflux for many years, particularly when she was working as it was very stressful. She was on Prilosec and has tried coming off after [facility] but symptoms return. She is now on Prilosec 20 mg- she notes this is well controlled now. She will get nausea maybe once or twice a week still. She is avoiding spicy/acidic foods. She probably needs to sit up longer- only giving herself 2 hours before lying down. She drinks sparkling water, no soda. No vomiting. She was experiencing epigastric pain but the prilosec has lessened it. It comes and goes. The pain is helped somewhat by eating. She describes the pain as a gnawing pain. She reports early satiety, she feels like food sits on her stomach. She notes a good appetite. her weight has gone up with the pandemic. She denies dysphagia. Her last EGD was 8 years ago with RGA, normal per patient. She has varying bowel habits. Yesterday had 3 BM's, today 1 and over the weekend nothing. She has always been like this. She will take milk of magnesium as needed, if it has been up to 4 days without a BM. She recently started metamucil which seems to be helping. Her last colonoscopy was 10 years ago. No family hx of colon cancer. No blood in the stool or black/tarry stool. No DMII. No hx of MI, mild CAD. No hx of CVA. On ASA only. Normal renal function. Problem List/Past Medical (Ameena Kelley; 5/11/2021 12:42 PM)  Hypercholesterolemia    Hypertension    Hypothyroidism    Lactose intolerance (E73.9)      Past Surgical History (Ameena Kelley; 5/11/2021 12:42 PM)  Cholecystectomy    THROMBECTOMY, ARTERY, ULNAR (61982)    ASPIRATION, CYST, SPINAL CORD (92573)      Allergies (Ameena Kelley; 5/11/2021 12:42 PM)  Sulfa Drugs    Milk      Medication History (Ameena Kelley; 5/11/2021 12:43 PM)  Aspirin 81  (81MG Tablet Chewable, 1 tab Oral daily) Active. CoQ10  (100MG Capsule, 1 cap Oral daily) Active. hydroCHLOROthiazide  (25MG Tablet, 1 tab Oral daily) Active. Levothyroxine Sodium  (88MCG Tablet, 1 tab Oral every other day) Active. Levothyroxine Sodium  (75MCG Tablet, 1 tab Oral every other day) Active. Pravastatin Sodium  (20MG Tablet, 1 tab Oral daily) Active. Ramipril  (2.5MG Capsule, 1 cap Oral daily) Active. Omeprazole  (20MG Capsule DR, 1 cap Oral daily) Active. Multivitamin Adult  (1 tab Oral daily) Active. Medications Reconciled     Social History (Cece Kelley; 5/11/2021 12:42 PM)  Blood Transfusion   No.  Alcohol Use   Occasional alcohol use. Tobacco Use   Former smoker. Employment status   Retired. Marital status   , Significant other. Diagnostic Studies History (Cece Kelley; 5/11/2021 12:42 PM)  Colonoscopy   [2011]:  Endoscopy      Health Maintenance History (Cece Kelley; 5/11/2021 12:42 PM)  Flu Vaccine   [2020]:  Pneumovax   [2021]:        Review of Systems (Legrand Colla. Frankey Bills; 5/11/2021 12:42 PM)  General Not Present- Chronic Fatigue, Poor Appetite, Weight Gain and Weight Loss. Skin Not Present- Itching, Rash and Skin Color Changes. HEENT Not Present- Hearing Loss and Vertigo. Respiratory Not Present- Difficulty Breathing and TB exposure. Cardiovascular Not Present- Chest Pain, Use of Antibiotics before Dental Procedures and Use of Blood Thinners. Gastrointestinal Present- See HPI. Musculoskeletal Not Present- Arthritis, Hip Replacement Surgery and Knee Replacement Surgery. Neurological Not Present- Weakness. Psychiatric Not Present- Depression. Endocrine Not Present- Diabetes and Thyroid Problems. Hematology Not Present- Anemia. Vitals (Cece Kelley; 5/11/2021 12:42 PM)  5/11/2021 12:41 PM  Weight: 211.25 lb   Height: 66 in   Body Surface Area: 2.05 m²   Body Mass Index: 34.1 kg/m²    Pulse: 56 (Regular)     BP: 141/79(Sitting, Left Arm, Standard)              Physical Exam (Ivanna Romero PA-C; 5/11/2021 1:18 PM)  General  Mental Status - Alert. General Appearance - Cooperative, Pleasant, Not in acute distress. Build & Nutrition - Obese. Voice - Normal.    Eye  Eyeball - Left - No Exophthalmos. Eyeball - Right - No Exophthalmos. Sclera/Conjunctiva - Left - No Jaundice. Sclera/Conjunctiva - Right - No Jaundice. Chest and Lung Exam  Chest and lung exam reveals  - normal excursion with symmetric chest walls, quiet, even and easy respiratory effort with no use of accessory muscles and on auscultation, normal breath sounds, no adventitious sounds and normal vocal resonance. Cardiovascular  Cardiovascular examination reveals  - no digital clubbing, cyanosis, edema, increased warmth or tenderness. Auscultation  Heart Sounds - S1 WNL and S2 WNL. Murmurs & Other Heart Sounds - Auscultation of the heart reveals - No Murmurs. Abdomen  Inspection  Inspection of the abdomen reveals - Non-distended. Palpation/Percussion  Tenderness - Epigastrium. Rebound tenderness - No rebound. Liver - Normal size palpable at right costal margin. Spleen - Other Characteristics - Non Palpable. Abdominal Mass Palpable - No masses. Other Characteristics - No Ascites. Organomegally - None. Auscultation  Auscultation of the abdomen reveals - Bowel sounds normal.        Assessment & Plan (Ivanna Romero PA-C; 5/11/2021 1:14 PM)  GERD (gastroesophageal reflux disease) (K21.9)  Impression: GERD for many years  Has tried coming off of PPI but sxs return. Better now that on prilosec 20 mg  Discussed lifestyle modifications including avoiding lying down 4 hours after eating  Current Plans  Started Pantoprazole Sodium 40MG, 1 (one) Tablet 30 minutes before breakfast, #30, 30 days starting 05/11/2021, Ref. x3.  Epigastric pain (R10.13)  Impression: Started a couple months ago, better now with PPI 20 mg daily.  Associated nausea and gerd which have also improved with PPI. Rare NSAID use. Does report early satiety  recent labs with PCP done. Recommend EGD to evaluate for gastritis, PUD, H. pylori. Current Plans  Endoscopy (77303) (Discussed risks and benefits with the patient to include: perforation, post polypectomy, or post biopsy bleeding, missed lesions, and sedation reactions.)  Encounter for screening colonoscopy (Z12.11)  Impression: Last colonoscopy 10 years ago with RGA. Due for screening. Risks of procedure discussed  Current Plans  Colonoscopy (61782) (Discussed risks and benefits with the patient to include:; perforation, post polypectomy, or post biopsy bleeding, missed lesions, and sedation reactions.)  Started Sutab [id number], 1 (one) Tablet Take as directed before Colonoscopy, #24, 05/11/2021, No Refill. Local Order: Coupon Code: [facility]:[id number] PCN:[id number] Group:[id number] ID: [id number]  Constipation (K59.00)  Impression: For many years. Recently started metamucil which has really helped. Discussed high fiber diet, exercise, water intake. Can use miralax PRN  Current Plans  Patient is to call me for any questions or concerns. Pt Education - How to access health information online: discussed with patient and provided information. Plan of care was completed in collaboration with Dr. Betzy Sim.   Signed by Marquita Thakkar PA-C (5/11/2021 1:19 PM)

## 2021-10-07 NOTE — ANESTHESIA PREPROCEDURE EVALUATION
Relevant Problems   No relevant active problems       Anesthetic History   No history of anesthetic complications            Review of Systems / Medical History  Patient summary reviewed and pertinent labs reviewed    Pulmonary  Within defined limits              Comments: Inhaler use only with bronchitis-none recently    Neuro/Psych   Within defined limits           Cardiovascular    Hypertension: well controlled          Hyperlipidemia    Exercise tolerance: >4 METS     GI/Hepatic/Renal     GERD           Endo/Other      Hypothyroidism: well controlled  Arthritis     Other Findings              Physical Exam    Airway  Mallampati: III  TM Distance: 4 - 6 cm  Neck ROM: normal range of motion   Mouth opening: Normal     Cardiovascular    Rhythm: regular  Rate: normal         Dental    Dentition: Upper dentition intact, Lower dentition intact, Implants and Caps/crowns     Pulmonary  Breath sounds clear to auscultation               Abdominal         Other Findings            Anesthetic Plan    ASA: 2  Anesthesia type: MAC          Induction: Intravenous  Anesthetic plan and risks discussed with: Patient

## 2021-10-07 NOTE — PROCEDURES
Clayton Jaimes M.D.  [phone]           10/7/2021                EGD Operative Report  Macrina Gage  :  1955  [facility] Medical Record Number:  [id number]      Indication:  GERD     : Ahsan Barkley MD    Referring Provider:  Unique Church MD      Anesthesia/Sedation:  MAC anesthesia    Airway assessment: No airway problems anticipated    Pre-Procedural Exam:      Airway: clear, no airway problems anticipated  Heart: RRR, without gallops or rubs  Lungs: clear bilaterally without wheezes, crackles, or rhonchi  Abdomen: soft, nontender, nondistended, bowel sounds present  Mental Status: awake, alert and oriented to person, place and time       Procedure Details     After infomed consent was obtained for the procedure, with all risks and benefits of procedure explained the patient was taken to the endoscopy suite and placed in the left lateral decubitus position. Following sequential administration of sedation as per above, the endoscope was inserted into the mouth and advanced under direct vision to second portion of the duodenum. A careful inspection was made as the gastroscope was withdrawn, including a retroflexed view of the proximal stomach; findings and interventions are described below. Findings:   Esophagus:normal  Stomach: Mild erythema in the antrum with two superficial erosions, otherwise mucosa within normal  Duodenum/jejunum: normal    Therapies:  none    Specimens: Gastric biopsies           Complications:   None; patient tolerated the procedure well. EBL:  None.            Impression:    Mild antral gastritis, otherwise mucosa within normal    Recommendations:    -Continue acid suppression.  -Follow-up on pathology results  -Anti-reflux measures    Ahsan Barkley MD

## 2021-10-07 NOTE — PROGRESS NOTES
Endoscopy discharge instructions have been reviewed and given to patient. The patient verbalized understanding and acceptance of instructions. Dr. Tawanna Cole discussed with patient procedure findings and next steps.

## 2021-10-07 NOTE — DISCHARGE INSTRUCTIONS
[facility]. Lelia Castillo M.D.  [phone]                 COLON and EGD DISCHARGE INSTRUCTIONS    10/7/2021    Xiomara Sebastian  :  1955  [facility] Medical Record Number:  [id number]      DISCOMFORT:  Sore throat- throat lozenges or warm salt water gargle  Redness at IV site- apply warm compress to area; if redness or soreness persist- contact your physician  There may be a slight amount of blood passed from the rectum  Gaseous discomfort- walking, belching will help relieve any discomfort  You may not operate a vehicle for 12 hours  You may not engage in an occupation involving machinery or appliances for rest of today  You may not drink alcoholic beverages for at least 12 hours  Avoid making any critical decisions for at least 24 hour  DIET:   High fiber diet. - however -  remember your colon is empty and a heavy meal will produce gas. Avoid these foods:  vegetables, fried / greasy foods, carbonated drinks for today     ACTIVITY:  You may  resume your normal daily activities it is recommended that you spend the remainder of the day resting -  avoid any strenuous activity and driving. CALL M.D. ANY SIGN OF:   Increasing pain, nausea, vomiting  Abdominal distension (swelling)  New increased bleeding (oral or rectal)  Fever (chills)  Pain in chest area  Bloody discharge from nose or mouth  Shortness of breath      Follow-up Instructions:   Call Dr. Lucien Dempsey if any questions at [phone]. Results of procedure / biopsy in 7 to 10 days, we will call you with these results. Your endoscopy showed mild gastritis, biopsies were obtained, esophagus was within normal. Continue taking omeprazole. Your colonoscopy showed small internal hemorrhoids, otherwise looked within normal. Repeat colonoscopy in 10 years.

## 2021-10-09 NOTE — ANESTHESIA POSTPROCEDURE EVALUATION
Procedure(s):  COLONOSCOPY  ESOPHAGOGASTRODUODENOSCOPY (EGD)  ESOPHAGOGASTRODUODENAL (EGD) BIOPSY. MAC       Patient discharged by PACU nursing without anesthesiologist evaluation.           <BSHSIANPOST>    INITIAL Post-op Vital signs:   Vitals Value Taken Time   /75 10/07/21 0854   Temp 36.6 °C (97.8 °F) 10/07/21 0840   Pulse 57 10/07/21 0854   Resp 13 10/07/21 0854   SpO2 99 % 10/07/21 0854

## 2021-10-31 DIAGNOSIS — E03.9 ACQUIRED HYPOTHYROIDISM: ICD-10-CM

## 2021-10-31 DIAGNOSIS — I10 ESSENTIAL HYPERTENSION: ICD-10-CM

## 2021-11-01 DIAGNOSIS — E78.00 HYPERCHOLESTEREMIA: ICD-10-CM

## 2021-11-01 DIAGNOSIS — E03.9 ACQUIRED HYPOTHYROIDISM: Primary | ICD-10-CM

## 2021-11-01 DIAGNOSIS — I10 ESSENTIAL HYPERTENSION: ICD-10-CM

## 2021-11-01 RX ORDER — RAMIPRIL 2.5 MG/1
CAPSULE ORAL
Qty: 90 CAPSULE | Refills: 0 | Status: SHIPPED | OUTPATIENT
Start: 2021-11-01 | End: 2022-02-10

## 2021-11-01 RX ORDER — LEVOTHYROXINE SODIUM 88 UG/1
TABLET ORAL
Qty: 45 TABLET | Refills: 0 | Status: SHIPPED | OUTPATIENT
Start: 2021-11-01 | End: 2022-02-10

## 2021-11-04 LAB — MAMMOGRAPHY, EXTERNAL: NORMAL

## 2021-11-15 ENCOUNTER — OFFICE VISIT (OUTPATIENT)
Dept: FAMILY MEDICINE CLINIC | Age: 66
End: 2021-11-15
Payer: MEDICARE

## 2021-11-15 VITALS
WEIGHT: 210 LBS | BODY MASS INDEX: 33.75 KG/M2 | OXYGEN SATURATION: 99 % | SYSTOLIC BLOOD PRESSURE: 123 MMHG | HEIGHT: 66 IN | TEMPERATURE: 97.4 F | DIASTOLIC BLOOD PRESSURE: 75 MMHG | HEART RATE: 59 BPM

## 2021-11-15 DIAGNOSIS — E03.9 ACQUIRED HYPOTHYROIDISM: ICD-10-CM

## 2021-11-15 DIAGNOSIS — K31.9 GASTROPATHY: Primary | ICD-10-CM

## 2021-11-15 DIAGNOSIS — E78.00 HYPERCHOLESTEREMIA: ICD-10-CM

## 2021-11-15 DIAGNOSIS — I10 ESSENTIAL HYPERTENSION: ICD-10-CM

## 2021-11-15 DIAGNOSIS — R93.1 HIGH CORONARY ARTERY CALCIUM SCORE: ICD-10-CM

## 2021-11-15 PROCEDURE — G8417 CALC BMI ABV UP PARAM F/U: HCPCS | Performed by: FAMILY MEDICINE

## 2021-11-15 PROCEDURE — 1101F PT FALLS ASSESS-DOCD LE1/YR: CPT | Performed by: FAMILY MEDICINE

## 2021-11-15 PROCEDURE — G8399 PT W/DXA RESULTS DOCUMENT: HCPCS | Performed by: FAMILY MEDICINE

## 2021-11-15 PROCEDURE — G8754 DIAS BP LESS 90: HCPCS | Performed by: FAMILY MEDICINE

## 2021-11-15 PROCEDURE — G9899 SCRN MAM PERF RSLTS DOC: HCPCS | Performed by: FAMILY MEDICINE

## 2021-11-15 PROCEDURE — G8427 DOCREV CUR MEDS BY ELIG CLIN: HCPCS | Performed by: FAMILY MEDICINE

## 2021-11-15 PROCEDURE — G8510 SCR DEP NEG, NO PLAN REQD: HCPCS | Performed by: FAMILY MEDICINE

## 2021-11-15 PROCEDURE — 3017F COLORECTAL CA SCREEN DOC REV: CPT | Performed by: FAMILY MEDICINE

## 2021-11-15 PROCEDURE — 99214 OFFICE O/P EST MOD 30 MIN: CPT | Performed by: FAMILY MEDICINE

## 2021-11-15 PROCEDURE — G8752 SYS BP LESS 140: HCPCS | Performed by: FAMILY MEDICINE

## 2021-11-15 PROCEDURE — 1090F PRES/ABSN URINE INCON ASSESS: CPT | Performed by: FAMILY MEDICINE

## 2021-11-15 PROCEDURE — G8536 NO DOC ELDER MAL SCRN: HCPCS | Performed by: FAMILY MEDICINE

## 2021-11-15 RX ORDER — ASPIRIN 81 MG/1
81 TABLET ORAL DAILY
Qty: 100 TABLET | Refills: 4
Start: 2021-11-15

## 2021-11-15 NOTE — PROGRESS NOTES
Identified pt with two pt identifiers(name and ). Reviewed record in preparation for visit and have obtained necessary documentation. Chief Complaint   Patient presents with    Follow-up    GERD        Vitals:    11/15/21 1021   BP: 123/75   Pulse: (!) 59   Temp: 97.4 °F (36.3 °C)   TempSrc: Temporal   SpO2: 99%   Weight: 210 lb (95.3 kg)   Height: 5' 6\" (1.676 m)   PainSc:   0 - No pain       Health Maintenance Due   Topic    Cervical cancer screen     Lipid Screen        Coordination of Care Questionnaire:  :   1) Have you been to an emergency room, urgent care, or hospitalized since your last visit? If yes, where when, and reason for visit? No      2. Have seen or consulted any other health care provider since your last visit? If yes, where when, and reason for visit?   No

## 2021-11-15 NOTE — PROGRESS NOTES
HISTORY OF PRESENT ILLNESS  Jihan Lazar is a 72 y.o. female. Patient presents with: Follow-up  GERD    She has seen Gastroenterology and she has gastritis. Also, she also needs to follow up on her HTN. Review of Systems   Eyes: Negative for blurred vision. Respiratory: Negative for shortness of breath. Cardiovascular: Negative for chest pain. Gastrointestinal: Positive for abdominal pain, heartburn and nausea. Negative for vomiting. She is having symptoms about every 1 1/2 to 2 weeks. Neurological: Negative for dizziness, sensory change, speech change, focal weakness and headaches. Visit Vitals  /75 (BP 1 Location: Left upper arm, BP Patient Position: Sitting, BP Cuff Size: Large adult)   Pulse (!) 59   Temp 97.4 °F (36.3 °C) (Temporal)   Ht 5' 6\" (1.676 m)   Wt 210 lb (95.3 kg)   SpO2 99%   BMI 33.89 kg/m²     Physical Exam  Vitals and nursing note reviewed. Constitutional:       General: She is not in acute distress. Appearance: Normal appearance. She is well-developed. She is not diaphoretic. Cardiovascular:      Rate and Rhythm: Normal rate and regular rhythm. Heart sounds: Normal heart sounds. No murmur heard. No friction rub. No gallop. Pulmonary:      Effort: Pulmonary effort is normal. No respiratory distress. Breath sounds: Normal breath sounds. No wheezing or rales. Abdominal:      General: Bowel sounds are normal. There is no distension. Palpations: Abdomen is soft. Tenderness: There is abdominal tenderness in the right upper quadrant and right lower quadrant. There is no guarding or rebound. Comments: The tenderness is very mild   Skin:     General: Skin is warm and dry. Neurological:      Mental Status: She is alert and oriented to person, place, and time. ASSESSMENT and PLAN    ICD-10-CM ICD-9-CM    1. Gastropathy  K31.9 537.9    2. Essential hypertension  K50 487.1 METABOLIC PANEL, BASIC   3. Hypercholesteremia  E78.00 272.0 HEPATIC FUNCTION PANEL      NMR LIPOPROFILE WITH LIPIDS (WITHOUT GRAPH)   4. High coronary artery calcium score  R93.1 414.00 NMR LIPOPROFILE WITH LIPIDS (WITHOUT GRAPH)      aspirin delayed-release (Aspir-81) 81 mg tablet   5. Acquired hypothyroidism  E03.9 244.9 TSH 3RD GENERATION        Patient has gastropathy rather than gastroesophageal reflux disease, controlled  Blood pressure controlled  Labs per orders. Continue current plans. Follow-up and Dispositions    · Return in about 6 months (around 5/15/2022) for blood pressure. Reviewed plan of care. Patient has provided input and agrees with goals.

## 2021-11-20 DIAGNOSIS — E78.00 HYPERCHOLESTEREMIA: Primary | ICD-10-CM

## 2021-11-20 RX ORDER — ROSUVASTATIN CALCIUM 10 MG/1
10 TABLET, COATED ORAL
Qty: 90 TABLET | Refills: 3 | Status: SHIPPED | OUTPATIENT
Start: 2021-11-20 | End: 2021-12-06 | Stop reason: ALTCHOICE

## 2021-12-03 ENCOUNTER — TELEPHONE (OUTPATIENT)
Dept: FAMILY MEDICINE CLINIC | Age: 66
End: 2021-12-03

## 2021-12-03 NOTE — TELEPHONE ENCOUNTER
Pt called regarding message received a couple of weeks ago regarding elevated cholesterol and does not want to switch from Pravastatin to Crestor due to history of severe side affects with Lipitor and Zetia, noting abdominal cramping, diarrhea and GI upset. Pt also wants Dr. Sudhakar Neal to know she had been on a trip just prior to labs and only ate high fatty foods provided, out of the norm for her, but was the only thing available on the bus trip. Pt requesting call back to advise if Dr. Sudhakar Neal will allow her to remain on Pravastatin now that she's back on a healthy diet. Also, pt never received lab letter or prescription being mailed 11/20/21.  Alonzo

## 2021-12-06 DIAGNOSIS — E78.00 HYPERCHOLESTEREMIA: ICD-10-CM

## 2021-12-06 RX ORDER — PRAVASTATIN SODIUM 40 MG/1
40 TABLET ORAL
Qty: 90 TABLET | Refills: 0 | Status: SHIPPED | OUTPATIENT
Start: 2021-12-06 | End: 2022-02-13

## 2021-12-06 NOTE — TELEPHONE ENCOUNTER
Cholesterol levels reflect about a 3 month period and are overall not affected by recent bad eating habits. I would like for her to try Crestor as I sometimes try this with people who do not tolerate statins with success.

## 2021-12-06 NOTE — TELEPHONE ENCOUNTER
Has she taken Crestor before? I do not have a record of it. I prefer this to pravastatin due her her high coronary calcium score. However, I can increase her pravastatin but it is not the best choice.

## 2021-12-06 NOTE — TELEPHONE ENCOUNTER
Called and spoke with pt. She states she does not want to take Crestor, due to  cramping while on this medication. Pt asks can she increase the Pravastatin? Pt also states Cresor was never sent to her pharmacy, and if she needs to be on it, it will need to be called in to her pharmacy.

## 2021-12-06 NOTE — TELEPHONE ENCOUNTER
Please call patient and let her know I am increasing her pravastatin and am sending it to her pharmacy. She will need fasting labs in 3 months and I have printed an order.

## 2021-12-12 RX ORDER — HYDROCHLOROTHIAZIDE 25 MG/1
TABLET ORAL
Qty: 90 TABLET | Refills: 3 | Status: SHIPPED | OUTPATIENT
Start: 2021-12-12

## 2022-02-07 DIAGNOSIS — I10 ESSENTIAL HYPERTENSION: ICD-10-CM

## 2022-02-07 DIAGNOSIS — E03.9 ACQUIRED HYPOTHYROIDISM: ICD-10-CM

## 2022-02-10 RX ORDER — LEVOTHYROXINE SODIUM 88 UG/1
TABLET ORAL
Qty: 45 TABLET | Refills: 3 | Status: SHIPPED | OUTPATIENT
Start: 2022-02-10

## 2022-02-10 RX ORDER — LEVOTHYROXINE SODIUM 75 UG/1
TABLET ORAL
Qty: 45 TABLET | Refills: 3 | Status: SHIPPED | OUTPATIENT
Start: 2022-02-10

## 2022-02-10 RX ORDER — RAMIPRIL 2.5 MG/1
CAPSULE ORAL
Qty: 90 CAPSULE | Refills: 3 | Status: SHIPPED | OUTPATIENT
Start: 2022-02-10

## 2022-02-13 DIAGNOSIS — R93.1 HIGH CORONARY ARTERY CALCIUM SCORE: Primary | ICD-10-CM

## 2022-02-13 DIAGNOSIS — E78.00 HYPERCHOLESTEREMIA: ICD-10-CM

## 2022-02-13 RX ORDER — PRAVASTATIN SODIUM 80 MG/1
80 TABLET ORAL
Qty: 90 TABLET | Refills: 0 | Status: SHIPPED | OUTPATIENT
Start: 2022-02-13 | End: 2022-05-13 | Stop reason: ALTCHOICE

## 2022-03-19 PROBLEM — J30.89 ALLERGIC RHINITIS DUE TO DUST MITE: Status: ACTIVE | Noted: 2018-03-08

## 2022-03-19 PROBLEM — I10 ESSENTIAL HYPERTENSION: Status: ACTIVE | Noted: 2018-03-08

## 2022-03-19 PROBLEM — R93.1 HIGH CORONARY ARTERY CALCIUM SCORE: Status: ACTIVE | Noted: 2019-08-05

## 2022-03-20 PROBLEM — M85.89 OSTEOPENIA OF MULTIPLE SITES: Status: ACTIVE | Noted: 2021-05-17

## 2022-05-13 DIAGNOSIS — E78.00 HYPERCHOLESTEREMIA: Primary | ICD-10-CM

## 2022-05-13 RX ORDER — ATORVASTATIN CALCIUM 20 MG/1
20 TABLET, FILM COATED ORAL
Qty: 90 TABLET | Refills: 0 | Status: SHIPPED | OUTPATIENT
Start: 2022-05-13 | End: 2022-05-16

## 2022-05-16 ENCOUNTER — OFFICE VISIT (OUTPATIENT)
Dept: FAMILY MEDICINE CLINIC | Age: 67
End: 2022-05-16
Payer: MEDICARE

## 2022-05-16 VITALS
HEART RATE: 59 BPM | HEIGHT: 66 IN | RESPIRATION RATE: 16 BRPM | DIASTOLIC BLOOD PRESSURE: 76 MMHG | SYSTOLIC BLOOD PRESSURE: 137 MMHG | BODY MASS INDEX: 32.4 KG/M2 | WEIGHT: 201.6 LBS | TEMPERATURE: 97.9 F | OXYGEN SATURATION: 100 %

## 2022-05-16 DIAGNOSIS — E78.00 HYPERCHOLESTEREMIA: ICD-10-CM

## 2022-05-16 DIAGNOSIS — I10 ESSENTIAL HYPERTENSION: Primary | ICD-10-CM

## 2022-05-16 PROCEDURE — 1090F PRES/ABSN URINE INCON ASSESS: CPT | Performed by: FAMILY MEDICINE

## 2022-05-16 PROCEDURE — G8399 PT W/DXA RESULTS DOCUMENT: HCPCS | Performed by: FAMILY MEDICINE

## 2022-05-16 PROCEDURE — 99214 OFFICE O/P EST MOD 30 MIN: CPT | Performed by: FAMILY MEDICINE

## 2022-05-16 PROCEDURE — G8510 SCR DEP NEG, NO PLAN REQD: HCPCS | Performed by: FAMILY MEDICINE

## 2022-05-16 PROCEDURE — G8754 DIAS BP LESS 90: HCPCS | Performed by: FAMILY MEDICINE

## 2022-05-16 PROCEDURE — G8427 DOCREV CUR MEDS BY ELIG CLIN: HCPCS | Performed by: FAMILY MEDICINE

## 2022-05-16 PROCEDURE — G8417 CALC BMI ABV UP PARAM F/U: HCPCS | Performed by: FAMILY MEDICINE

## 2022-05-16 PROCEDURE — 3017F COLORECTAL CA SCREEN DOC REV: CPT | Performed by: FAMILY MEDICINE

## 2022-05-16 PROCEDURE — G8536 NO DOC ELDER MAL SCRN: HCPCS | Performed by: FAMILY MEDICINE

## 2022-05-16 PROCEDURE — G9899 SCRN MAM PERF RSLTS DOC: HCPCS | Performed by: FAMILY MEDICINE

## 2022-05-16 PROCEDURE — G8752 SYS BP LESS 140: HCPCS | Performed by: FAMILY MEDICINE

## 2022-05-16 PROCEDURE — 1101F PT FALLS ASSESS-DOCD LE1/YR: CPT | Performed by: FAMILY MEDICINE

## 2022-05-16 RX ORDER — DICYCLOMINE HYDROCHLORIDE 10 MG/1
10 CAPSULE ORAL AS NEEDED
COMMUNITY

## 2022-05-16 RX ORDER — PRAVASTATIN SODIUM 80 MG/1
80 TABLET ORAL
Qty: 90 TABLET | Refills: 1 | Status: SHIPPED | OUTPATIENT
Start: 2022-05-16

## 2022-05-16 NOTE — PROGRESS NOTES
Room 3     Identified pt with two pt identifiers(name and ). Reviewed record in preparation for visit and have obtained necessary documentation. All patient medications has been reviewed. Chief Complaint   Patient presents with    Hypertension     6 month f/u    Cholesterol Problem     6 month f/u    Immunization/Injection     pt wants to know if she should get 2nd pumoccoal and 2nd booster for covid        3 most recent PHQ Screens 2022   Little interest or pleasure in doing things Not at all   Feeling down, depressed, irritable, or hopeless Not at all   Total Score PHQ 2 0     Abuse Screening Questionnaire 2021   Do you ever feel afraid of your partner? N   Are you in a relationship with someone who physically or mentally threatens you? N   Is it safe for you to go home? Y       Health Maintenance Due   Topic    Pneumococcal 65+ years (2 - PCV)    Medicare Yearly Exam          Health Maintenance Review: Patient reminded of \"due or due soon\" health maintenance. I have asked the patient to contact his/her primary care provider (PCP) for follow-up on his/her health maintenance.     Vitals:    22 1036 22 1051   BP: 135/79 137/76   Pulse: (!) 59    Resp: 16    Temp: 97.9 °F (36.6 °C)    TempSrc: Temporal    SpO2: 100%    Weight: 201 lb 9.6 oz (91.4 kg)    Height: 5' 6\" (1.676 m)    PainSc:   3    PainLoc: Head        Wt Readings from Last 3 Encounters:   22 201 lb 9.6 oz (91.4 kg)   11/15/21 210 lb (95.3 kg)   10/07/21 206 lb 2.1 oz (93.5 kg)     Temp Readings from Last 3 Encounters:   22 97.9 °F (36.6 °C) (Temporal)   11/15/21 97.4 °F (36.3 °C) (Temporal)   10/07/21 97.8 °F (36.6 °C)     BP Readings from Last 3 Encounters:   22 137/76   11/15/21 123/75   10/07/21 117/75     Pulse Readings from Last 3 Encounters:   22 (!) 59   11/15/21 (!) 59   10/07/21 (!) 57       Coordination of Care Questionnaire:   1) Have you been to an emergency room, urgent care, or hospitalized since your last visit?   no       2. Have seen or consulted any other health care provider since your last visit? NO    Patient is accompanied by self I have received verbal consent from Shikha Terry to discuss any/all medical information while they are present in the room.

## 2022-05-16 NOTE — PROGRESS NOTES
HISTORY OF PRESENT ILLNESS  Giulia Beckwith is a 77 y.o. female. Patient presents with:  Hypertension: 6 month f/u  Cholesterol Problem: 6 month f/u  Immunization/Injection: pt wants to know if she should get 2nd pumoccoal and 2nd booster for covid     Her recent LDL was not at goal on pravastatin so I just switched her to Lipitor, however, she has had severe myalgia with this in the past.  She has never tried Crestor. Cardiology has been following this and she has follow up scheduled in September. She is watching her diet and losing weight. Review of Systems   Eyes: Negative for blurred vision. Respiratory: Negative for shortness of breath. Cardiovascular: Negative for chest pain. Neurological: Negative for dizziness, sensory change, speech change, focal weakness and headaches. Visit Vitals  /76 (BP 1 Location: Right arm, BP Patient Position: Sitting, BP Cuff Size: Adult)   Pulse (!) 59   Temp 97.9 °F (36.6 °C) (Temporal)   Resp 16   Ht 5' 6\" (1.676 m)   Wt 201 lb 9.6 oz (91.4 kg)   SpO2 100%   BMI 32.54 kg/m²     Physical Exam    ASSESSMENT and PLAN    ICD-10-CM ICD-9-CM    1. Essential hypertension  I10 401.9    2. Hypercholesteremia  E78.00 272.0 pravastatin (PRAVACHOL) 80 mg tablet        Blood pressure controlled  Follow up with Cardiology on her lipids in September as planned, restart pravastatin for now  Start fish oil  Get COVID-19 booster  Will discuss pneumonia vaccine at [facility]    Follow-up and Dispositions    · Return in about 6 months (around 11/16/2022) for blood pressure, Medicare wellness visit. Reviewed plan of care. Patient has provided input and agrees with goals.

## 2022-09-16 ENCOUNTER — OFFICE VISIT (OUTPATIENT)
Dept: CARDIOLOGY CLINIC | Age: 67
End: 2022-09-16
Payer: MEDICARE

## 2022-09-16 VITALS
BODY MASS INDEX: 32.14 KG/M2 | OXYGEN SATURATION: 95 % | SYSTOLIC BLOOD PRESSURE: 136 MMHG | HEIGHT: 66 IN | HEART RATE: 58 BPM | DIASTOLIC BLOOD PRESSURE: 78 MMHG | WEIGHT: 200 LBS

## 2022-09-16 DIAGNOSIS — R93.1 HIGH CORONARY ARTERY CALCIUM SCORE: Primary | ICD-10-CM

## 2022-09-16 DIAGNOSIS — E78.00 HYPERCHOLESTEREMIA: ICD-10-CM

## 2022-09-16 DIAGNOSIS — I10 ESSENTIAL HYPERTENSION: ICD-10-CM

## 2022-09-16 PROCEDURE — 3017F COLORECTAL CA SCREEN DOC REV: CPT | Performed by: INTERNAL MEDICINE

## 2022-09-16 PROCEDURE — 99213 OFFICE O/P EST LOW 20 MIN: CPT | Performed by: INTERNAL MEDICINE

## 2022-09-16 PROCEDURE — 1123F ACP DISCUSS/DSCN MKR DOCD: CPT | Performed by: INTERNAL MEDICINE

## 2022-09-16 PROCEDURE — 1101F PT FALLS ASSESS-DOCD LE1/YR: CPT | Performed by: INTERNAL MEDICINE

## 2022-09-16 PROCEDURE — G9899 SCRN MAM PERF RSLTS DOC: HCPCS | Performed by: INTERNAL MEDICINE

## 2022-09-16 PROCEDURE — G8399 PT W/DXA RESULTS DOCUMENT: HCPCS | Performed by: INTERNAL MEDICINE

## 2022-09-16 PROCEDURE — 93010 ELECTROCARDIOGRAM REPORT: CPT | Performed by: INTERNAL MEDICINE

## 2022-09-16 PROCEDURE — G8536 NO DOC ELDER MAL SCRN: HCPCS | Performed by: INTERNAL MEDICINE

## 2022-09-16 PROCEDURE — G8427 DOCREV CUR MEDS BY ELIG CLIN: HCPCS | Performed by: INTERNAL MEDICINE

## 2022-09-16 PROCEDURE — G0463 HOSPITAL OUTPT CLINIC VISIT: HCPCS | Performed by: INTERNAL MEDICINE

## 2022-09-16 PROCEDURE — 93005 ELECTROCARDIOGRAM TRACING: CPT | Performed by: INTERNAL MEDICINE

## 2022-09-16 PROCEDURE — G8752 SYS BP LESS 140: HCPCS | Performed by: INTERNAL MEDICINE

## 2022-09-16 PROCEDURE — 1090F PRES/ABSN URINE INCON ASSESS: CPT | Performed by: INTERNAL MEDICINE

## 2022-09-16 PROCEDURE — G8754 DIAS BP LESS 90: HCPCS | Performed by: INTERNAL MEDICINE

## 2022-09-16 PROCEDURE — G8417 CALC BMI ABV UP PARAM F/U: HCPCS | Performed by: INTERNAL MEDICINE

## 2022-09-16 PROCEDURE — G8432 DEP SCR NOT DOC, RNG: HCPCS | Performed by: INTERNAL MEDICINE

## 2022-09-16 NOTE — PROGRESS NOTES
Laura Hand is a 77 y.o. female    Chief Complaint   Patient presents with    Follow-up     CAC=93(2019)    Cholesterol Problem    Hypertension       Chest pain possibly gastric    SOB no    Dizziness no    Swelling no    Refills no    Visit Vitals  /78 (BP 1 Location: Left upper arm, BP Patient Position: Sitting)   Pulse (!) 58   Ht 5' 6\" (1.676 m)   Wt 200 lb (90.7 kg)   SpO2 95%   BMI 32.28 kg/m²       1. Have you been to the ER, urgent care clinic since your last visit? Hospitalized since your last visit? No    2. Have you seen or consulted any other health care providers outside of the [address] [place] [address] since your last visit? Include any pap smears or colon screening.  No

## 2022-09-16 NOTE — PROGRESS NOTES
Office Follow-up    NAME: Xiomara Sebastian   :  1955  MRM:  [id number]    Date:  2022            Assessment:     Problem List  Date Reviewed: 2022            Codes Class Noted    Osteopenia of multiple sites ICD-10-CM: M85.89  ICD-9-CM: 733.90  2021        High coronary artery calcium score ICD-10-CM: R93.1  ICD-9-CM: 414.00  2019        Gastropathy ICD-10-CM: K31.9  ICD-9-CM: 537.9  Unknown        Hypothyroidism ICD-10-CM: E03.9  ICD-9-CM: 244.9  Unknown        Hypercholesteremia ICD-10-CM: E78.00  ICD-9-CM: 272.0  Unknown        Lumbar disc disease ICD-10-CM: M51.9  ICD-9-CM: 722.93  Unknown        Cavernous hemangioma of brain ([facility] ICD-10-CM: D18.02  ICD-9-CM: 228.02  Unknown        Pulmonary nodule ICD-10-CM: R91.1  ICD-9-CM: 793.11  Unknown    Overview Signed 3/8/2018 10:42 AM by Yamel Hernandez MD     stable, last CT around              History of seizure ICD-10-CM: Z87.898  ICD-9-CM: V13.89  Unknown    Overview Signed 3/8/2018 10:48 AM by Yamel Hernandez MD     once, due to acrylamide exposure             Essential hypertension ICD-10-CM: I10  ICD-9-CM: 401.9  3/8/2018        Allergic rhinitis due to dust mite ICD-10-CM: J30.89  ICD-9-CM: 477.8  3/8/2018              Plan:     Positive coronary artery calcium score: Stopped Zetia due to GI side effects. Able tolerate Pravachol 80mg po daily. NMR lipids in May 2022 were fair. Will continue diet and statins. Repeat NMR lipids in 1 year. Check CAC in   CAD by CAC (): CAC of 93. Continue ASA, Pravachol. Check CAC in   Hypertension: Blood pressure is controlled. Continue Ramipril and HCTZ. Monitor salt intake. Increase physical activity. Dyslipidemia: As above. Recheck Lipids. Diet and Pravachol. Cannot tolerate Zetia or Lipitor. Bradycardia: Continue to monitor. See Dr. Richi Jurado in 1 year. Subjective:     Xiomara Sebastian, a 77y.o. year-old who presents for followup.   She is returning today for follow-up. She had a coronary calcium score which was at 80 with majority distribution in the LAD. She was not able to tolerate Zetia due to GI sideeffects. No symptoms of chestpain, sob, palpitations. Exam:     Physical Exam:  Visit Vitals  /78 (BP 1 Location: Left upper arm, BP Patient Position: Sitting)   Pulse (!) 58   Ht 5' 6\" (1.676 m)   Wt 200 lb (90.7 kg)   SpO2 95%   BMI 32.28 kg/m²     General appearance - alert, well appearing, and in no distress  Mental status - affect appropriate to mood  Eyes - sclera anicteric, moist mucous membranes  Neck - supple, no significant adenopathy  Chest - clear to auscultation, no wheezes, rales or rhonchi      Medications:     Current Outpatient Medications   Medication Sig    ascorbic acid (VITAMIN C PO) Take  by mouth daily. fish oil-omega-3 fatty acids 340-1,000 mg capsule Take 1 Capsule by mouth daily. ferrous fumarate/vit Bcomp,C (SUPER B COMPLEX PO) Take  by mouth. dicyclomine (BENTYL) 10 mg capsule Take 10 mg by mouth as needed for Abdominal Cramps. pravastatin (PRAVACHOL) 80 mg tablet Take 1 Tablet by mouth nightly. levothyroxine (SYNTHROID) 88 mcg tablet TAKE ONE TABLET BY MOUTH EVERY OTHER DAY IN THE MORNING ON AN EMPTY STOMACH **ALTERNATING WITH THE 75MCG**    levothyroxine (SYNTHROID) 75 mcg tablet TAKE ONE TABLET BY MOUTH EVERY OTHER DAY **ALTERNATING WITH 88MCG**    ramipriL (ALTACE) 2.5 mg capsule TAKE ONE CAPSULE BY MOUTH DAILY    hydroCHLOROthiazide (HYDRODIURIL) 25 mg tablet TAKE ONE TABLET BY MOUTH DAILY    aspirin delayed-release (Aspir-81) 81 mg tablet Take 1 Tablet by mouth daily. omeprazole (PRILOSEC) 40 mg capsule     calcium-cholecalciferol, D3, (CALTRATE 600+D) tablet Take 1 Tablet by mouth daily. melatonin 5 mg cap capsule Take 5 mg by mouth nightly. albuterol (PROAIR HFA) 90 mcg/actuation inhaler Take 2 Puffs by inhalation every four (4) hours as needed for Wheezing.     coenzyme Q-10 (CO Q-10) 200 mg capsule Take 200 mg by mouth daily. multivitamin (ONE A DAY) tablet Take 1 Tab by mouth daily. No current facility-administered medications for this visit. Diagnostic Data Review:       No specialty comments available. Lab Review:     Lab Results   Component Value Date/Time    Cholesterol, total 170 10/08/2020 08:10 AM    Cholesterol, Total 144 05/09/2022 08:45 AM    HDL Cholesterol 54 10/08/2020 08:10 AM    LDL, calculated 94 10/08/2020 08:10 AM    LDL, calculated 67 12/05/2019 08:49 AM    Triglyceride 125 10/08/2020 08:10 AM     Lab Results   Component Value Date/Time    Creatinine 0.92 11/16/2021 08:31 AM     Lab Results   Component Value Date/Time    BUN 12 11/16/2021 08:31 AM     Lab Results   Component Value Date/Time    Potassium 3.8 11/16/2021 08:31 AM     No results found for: HBA1C, KMH9ASFP, RRN3FIYJ  No results found for: HGBPOC, HGB, HGBP, HGBEXT, HGBEXT  No results found for: PLT, PLTEXT, PLTEXT  No results for input(s): CPK, CKMB, TROIQ in the last 72 hours. No lab exists for component: CKQMB, CPKMB             ___________________________________________________    [facility].  Gaby Gutierrez MD, [facility] [place]

## 2022-11-10 LAB — MAMMOGRAPHY, EXTERNAL: NORMAL

## 2022-11-14 ENCOUNTER — OFFICE VISIT (OUTPATIENT)
Dept: FAMILY MEDICINE CLINIC | Age: 67
End: 2022-11-14
Payer: MEDICARE

## 2022-11-14 VITALS
BODY MASS INDEX: 31.98 KG/M2 | SYSTOLIC BLOOD PRESSURE: 130 MMHG | RESPIRATION RATE: 18 BRPM | DIASTOLIC BLOOD PRESSURE: 72 MMHG | OXYGEN SATURATION: 98 % | WEIGHT: 199 LBS | HEART RATE: 58 BPM | TEMPERATURE: 96.8 F | HEIGHT: 66 IN

## 2022-11-14 DIAGNOSIS — I10 ESSENTIAL HYPERTENSION: Primary | ICD-10-CM

## 2022-11-14 DIAGNOSIS — Z00.00 MEDICARE ANNUAL WELLNESS VISIT, SUBSEQUENT: ICD-10-CM

## 2022-11-14 DIAGNOSIS — E03.9 HYPOTHYROIDISM, UNSPECIFIED TYPE: ICD-10-CM

## 2022-11-14 PROCEDURE — G0439 PPPS, SUBSEQ VISIT: HCPCS | Performed by: FAMILY MEDICINE

## 2022-11-14 PROCEDURE — 3074F SYST BP LT 130 MM HG: CPT | Performed by: FAMILY MEDICINE

## 2022-11-14 PROCEDURE — 3017F COLORECTAL CA SCREEN DOC REV: CPT | Performed by: FAMILY MEDICINE

## 2022-11-14 PROCEDURE — 99214 OFFICE O/P EST MOD 30 MIN: CPT | Performed by: FAMILY MEDICINE

## 2022-11-14 PROCEDURE — G8427 DOCREV CUR MEDS BY ELIG CLIN: HCPCS | Performed by: FAMILY MEDICINE

## 2022-11-14 PROCEDURE — G8536 NO DOC ELDER MAL SCRN: HCPCS | Performed by: FAMILY MEDICINE

## 2022-11-14 PROCEDURE — G8417 CALC BMI ABV UP PARAM F/U: HCPCS | Performed by: FAMILY MEDICINE

## 2022-11-14 PROCEDURE — G8754 DIAS BP LESS 90: HCPCS | Performed by: FAMILY MEDICINE

## 2022-11-14 PROCEDURE — G8752 SYS BP LESS 140: HCPCS | Performed by: FAMILY MEDICINE

## 2022-11-14 PROCEDURE — G9899 SCRN MAM PERF RSLTS DOC: HCPCS | Performed by: FAMILY MEDICINE

## 2022-11-14 PROCEDURE — 1090F PRES/ABSN URINE INCON ASSESS: CPT | Performed by: FAMILY MEDICINE

## 2022-11-14 PROCEDURE — 1101F PT FALLS ASSESS-DOCD LE1/YR: CPT | Performed by: FAMILY MEDICINE

## 2022-11-14 PROCEDURE — 1123F ACP DISCUSS/DSCN MKR DOCD: CPT | Performed by: FAMILY MEDICINE

## 2022-11-14 PROCEDURE — 3078F DIAST BP <80 MM HG: CPT | Performed by: FAMILY MEDICINE

## 2022-11-14 PROCEDURE — G8510 SCR DEP NEG, NO PLAN REQD: HCPCS | Performed by: FAMILY MEDICINE

## 2022-11-14 PROCEDURE — G8399 PT W/DXA RESULTS DOCUMENT: HCPCS | Performed by: FAMILY MEDICINE

## 2022-11-14 RX ORDER — RAMIPRIL 2.5 MG/1
2.5 CAPSULE ORAL DAILY
Qty: 90 CAPSULE | Refills: 4 | Status: SHIPPED | OUTPATIENT
Start: 2022-11-14

## 2022-11-14 RX ORDER — HYDROCHLOROTHIAZIDE 25 MG/1
25 TABLET ORAL DAILY
Qty: 90 TABLET | Refills: 4 | Status: SHIPPED | OUTPATIENT
Start: 2022-11-14

## 2022-11-14 NOTE — PROGRESS NOTES
HISTORY OF PRESENT ILLNESS  Caio Abbott is a 77 y.o. female. Caio Abbott is here to follow up on their HTN. She also needs to follow up on her thyroid. Review of Systems   Eyes:  Negative for blurred vision. Respiratory:  Negative for shortness of breath. Cardiovascular:  Negative for chest pain. Neurological:  Negative for dizziness, sensory change, speech change, focal weakness and headaches. Visit Vitals  /72 Comment: right arm, manual cuff   Pulse (!) 58   Temp 96.8 °F (36 °C) (Temporal)   Resp 18   Ht 5' 6\" (1.676 m)   Wt 199 lb (90.3 kg)   SpO2 98%   BMI 32.12 kg/m²     BP Readings from Last 3 Encounters:   11/14/22 (!) 140/72   09/16/22 136/78   05/16/22 137/76     Physical Exam  Vitals and nursing note reviewed. Constitutional:       General: She is not in acute distress. Appearance: She is well-developed. She is not diaphoretic. Cardiovascular:      Rate and Rhythm: Normal rate and regular rhythm. Heart sounds: Normal heart sounds. No murmur heard. No friction rub. No gallop. Pulmonary:      Effort: Pulmonary effort is normal. No respiratory distress. Breath sounds: Normal breath sounds. No wheezing or rales. Skin:     General: Skin is warm and dry. Neurological:      Mental Status: She is alert and oriented to person, place, and time. ASSESSMENT and PLAN    ICD-10-CM ICD-9-CM    1. Essential hypertension  H61 638.7 METABOLIC PANEL, BASIC      ramipriL (ALTACE) 2.5 mg capsule      hydroCHLOROthiazide (HYDRODIURIL) 25 mg tablet      2. Hypothyroidism, unspecified type  E03.9 244.9 TSH 3RD GENERATION        Blood pressure controlled  Labs per orders. Continue current plans. Refills per orders    Follow-up and Dispositions    Return in about 6 months (around 5/14/2023) for blood pressure. Reviewed plan of care. Patient has provided input and agrees with goals.         This is the Subsequent Medicare Annual Wellness Exam, performed 12 months or more after the Initial AWV or the last Subsequent AWV    I have reviewed the patient's medical history in detail and updated the computerized patient record. Assessment/Plan   Education and counseling provided:  Are appropriate based on today's review and evaluation  Advance Care Plan or Surrogate Decision Maker documented in medical record. 1. Essential hypertension  2. Hypothyroidism, unspecified type  3. Medicare annual wellness visit, subsequent       Depression Risk Factor Screening     3 most recent PHQ Screens 11/14/2022   Little interest or pleasure in doing things Not at all   Feeling down, depressed, irritable, or hopeless Not at all   Total Score PHQ 2 0       Alcohol & Drug Abuse Risk Screen   Do you average more than 1 drink per night or more than 7 drinks a week?: (P) No  On any one occasion in the past three months have you had more than 3 drinks containing alcohol?: (P) No          Functional Ability and Level of Safety   Hearing:  Hearing: (P) Patient reports hearing is good    Activities of Daily Living:   The home contains: (P) no safety equipment  Functional ADLs: (P) Patient does total self care   ADL Assessment 11/14/2022   Feeding yourself No Help Needed   Getting from bed to chair No Help Needed   Getting dressed No Help Needed   Bathing or showering No Help Needed   Walk across the room (includes cane/walker) No Help Needed   Using the telphone No Help Needed   Taking your medications No Help Needed   Preparing meals No Help Needed   Managing money (expenses/bills) No Help Needed   Moderately strenuous housework (laundry) No Help Needed   Shopping for personal items (toiletries/medicines) No Help Needed   Shopping for groceries No Help Needed   Driving No Help Needed   Climbing a flight of stairs No Help Needed   Getting to places beyond walking distances No Help Needed   Ambulation:  Patient ambulates: (P) with no difficulty   Fall Risk:  Fall Risk Assessment, last 15 mths 11/14/2022   Able to walk? Yes   Fall in past 12 months? 1   Do you feel unsteady? -   Are you worried about falling -   Number of falls in past 12 months 1   Fall with injury?  1     Abuse Screen:  Do you ever feel afraid of your partner?: No  Are you in a relationship with someone who physically or mentally threatens you?: No  Is it safe for you to go home?: Yes      Cognitive Screening   Has your family/caregiver stated any concerns about your memory?: (P) No   Cognitive Screening: Normal - Verbal Fluency Test    Health Maintenance Due     Health Maintenance Due   Topic Date Due    COVID-19 Vaccine (4 - Booster for [employer] series) 11/22/2021    Medicare Yearly Exam  05/18/2022    Flu Vaccine (1) 08/01/2022       Patient Care Team   Patient Care Team:  Eduardo Weathers MD as PCP - General (Family Medicine)  Eduardo Weathers MD as PCP - [address] [place] [address] Provider  Daisha Gonzalez MD (Gastroenterology)  Kalyan Berry MD (Obstetrics & Gynecology)    History     Patient Active Problem List   Diagnosis Code    Gastropathy K31.9    Hypothyroidism E03.9    Hypercholesteremia E78.00    Lumbar disc disease M51.9    Cavernous hemangioma of brain ([address] D18.02    Pulmonary nodule R91.1    History of seizure Z87.898    Essential hypertension I10    Allergic rhinitis due to dust mite J30.89    High coronary artery calcium score R93.1    Osteopenia of multiple sites M85.89     Past Medical History:   Diagnosis Date    Allergic rhinitis due to dust mite 3/8/2018    Cavernous hemangioma of brain ([facility]     Gastropathy     GERD (gastroesophageal reflux disease)     High coronary artery calcium score 8/5/2019    History of seizure     once, due to acrylamide exposure    Hypercholesteremia     Hypertension     Hypothyroidism     Lumbar disc disease     Osteopenia of multiple sites 5/17/2021    Pulmonary nodule     stable, last CT around 2014      Past Surgical History:   Procedure Laterality Date    COLONOSCOPY N/A 10/7/2021 COLONOSCOPY performed by Johan Brown MD at [facility] ENDOSCOPY    HX CHOLECYSTECTOMY N/A 2009    HX LUMBAR LAMINECTOMY  1992    cyst removed from lumbar spine    HX ORTHOPAEDIC Right     clot removed from hand, age 5     Current Outpatient Medications   Medication Sig Dispense Refill    ascorbic acid (VITAMIN C PO) Take  by mouth daily. fish oil-omega-3 fatty acids 340-1,000 mg capsule Take 1 Capsule by mouth daily. ferrous fumarate/vit Bcomp,C (SUPER B COMPLEX PO) Take  by mouth. dicyclomine (BENTYL) 10 mg capsule Take 10 mg by mouth as needed for Abdominal Cramps. pravastatin (PRAVACHOL) 80 mg tablet Take 1 Tablet by mouth nightly. 90 Tablet 1    levothyroxine (SYNTHROID) 88 mcg tablet TAKE ONE TABLET BY MOUTH EVERY OTHER DAY IN THE MORNING ON AN EMPTY STOMACH **ALTERNATING WITH THE 75MCG** 45 Tablet 3    levothyroxine (SYNTHROID) 75 mcg tablet TAKE ONE TABLET BY MOUTH EVERY OTHER DAY **ALTERNATING WITH 88MCG** 45 Tablet 3    ramipriL (ALTACE) 2.5 mg capsule TAKE ONE CAPSULE BY MOUTH DAILY 90 Capsule 3    hydroCHLOROthiazide (HYDRODIURIL) 25 mg tablet TAKE ONE TABLET BY MOUTH DAILY 90 Tablet 3    aspirin delayed-release (Aspir-81) 81 mg tablet Take 1 Tablet by mouth daily. 100 Tablet 4    omeprazole (PRILOSEC) 40 mg capsule       calcium-cholecalciferol, D3, (CALTRATE 600+D) tablet Take 1 Tablet by mouth daily. melatonin 5 mg cap capsule Take 5 mg by mouth nightly. albuterol (PROAIR HFA) 90 mcg/actuation inhaler Take 2 Puffs by inhalation every four (4) hours as needed for Wheezing. 1 Inhaler 0    coenzyme Q-10 (CO Q-10) 200 mg capsule Take 200 mg by mouth daily. multivitamin (ONE A DAY) tablet Take 1 Tab by mouth daily.        Allergies   Allergen Reactions    Lipitor [Atorvastatin] Myalgia    Sulfa (Sulfonamide Antibiotics) Seizures       Family History   Problem Relation Age of Onset    Cancer Father         lung    Thyroid Disease Mother     Dementia Mother     Hypertension Mother No Known Problems Brother      Social History     Tobacco Use    Smoking status: Former     Packs/day: 1.00     Types: Cigarettes     Quit date: 2/5/2007     Years since quitting: 15.7    Smokeless tobacco: Never   Substance Use Topics    Alcohol use:  Yes     Alcohol/week: 1.0 standard drink     Types: 1 Glasses of wine per week     Comment: rarely, 1 glass of wine with dinner occasionallt          Liborio Moise MD

## 2022-11-14 NOTE — PROGRESS NOTES
Mac Cheng is a 77 y.o. female    Chief Complaint   Patient presents with    Annual Wellness Visit       Visit Vitals  BP (!) 140/72   Pulse (!) 58   Temp 96.8 °F (36 °C) (Temporal)   Resp 18   Ht 5' 6\" (1.676 m)   Wt 199 lb (90.3 kg)   SpO2 98%   BMI 32.12 kg/m²       3 most recent PHQ Screens 11/14/2022   Little interest or pleasure in doing things Not at all   Feeling down, depressed, irritable, or hopeless Not at all   Total Score PHQ 2 0       Fall Risk Assessment, last 12 mths 11/14/2022   Able to walk? Yes   Fall in past 12 months? 1   Do you feel unsteady? -   Are you worried about falling -   Number of falls in past 12 months 1   Fall with injury? 1       Abuse Screening Questionnaire 11/14/2022   Do you ever feel afraid of your partner? N   Are you in a relationship with someone who physically or mentally threatens you? N   Is it safe for you to go home? Y       1. Have you been to the ER, urgent care clinic since your last visit? Hospitalized since your last visit? No     2. Have you seen or consulted any other health care providers outside of the [address] [place] [address] since your last visit? Include any pap smears or colon screening.  No

## 2022-11-14 NOTE — PATIENT INSTRUCTIONS
Medicare Wellness Visit, Female     The best way to live healthy is to have a lifestyle where you eat a well-balanced diet, exercise regularly, limit alcohol use, and quit all forms of tobacco/nicotine, if applicable. Regular preventive services are another way to keep healthy. Preventive services (vaccines, screening tests, monitoring & exams) can help personalize your care plan, which helps you manage your own care. Screening tests can find health problems at the earliest stages, when they are easiest to treat. [facility] follows the current, evidence-based guidelines published by the [facility] (RUSTSTF) when recommending preventive services for our patients. Because we follow these guidelines, sometimes recommendations change over time as research supports it. (For example, mammograms used to be recommended annually. Even though Medicare will still pay for an annual mammogram, the newer guidelines recommend a mammogram every two years for women of average risk). Of course, you and your doctor may decide to screen more often for some diseases, based on your risk and your co-morbidities (chronic disease you are already diagnosed with). Preventive services for you include:  - Medicare offers their members a free annual wellness visit, which is time for you and your primary care provider to discuss and plan for your preventive service needs. Take advantage of this benefit every year!  -All adults over the age of 72 should receive the recommended pneumonia vaccines. Current USPSTF guidelines recommend a series of two vaccines for the best pneumonia protection.   -All adults should have a flu vaccine yearly and a tetanus vaccine every 10 years.   -All adults age 48 and older should receive the shingles vaccines (series of two vaccines).       -All adults age 38-68 who are overweight should have a diabetes screening test once every three years.   -All adults born between [birth year] and 1965 should be screened once for Hepatitis C.  -Other screening tests and preventive services for persons with diabetes include: an eye exam to screen for diabetic retinopathy, a kidney function test, a foot exam, and stricter control over your cholesterol.   -Cardiovascular screening for adults with routine risk involves an electrocardiogram (ECG) at intervals determined by your doctor.   -Colorectal cancer screenings should be done for adults age 54-65 with no increased risk factors for colorectal cancer. There are a number of acceptable methods of screening for this type of cancer. Each test has its own benefits and drawbacks. Discuss with your doctor what is most appropriate for you during your annual wellness visit. The different tests include: colonoscopy (considered the best screening method), a fecal occult blood test, a fecal DNA test, and sigmoidoscopy.    -A bone mass density test is recommended when a woman turns 65 to screen for osteoporosis. This test is only recommended one time, as a screening. Some providers will use this same test as a disease monitoring tool if you already have osteoporosis. -Breast cancer screenings are recommended every other year for women of normal risk, age 54-69.  -Cervical cancer screenings for women over age 72 are only recommended with certain risk factors.      Here is a list of your current Health Maintenance items (your personalized list of preventive services) with a due date:  Health Maintenance Due   Topic Date Due    COVID-19 Vaccine (4 - Booster for [employer] series) 11/22/2021    Annual Well Visit  05/18/2022    Yearly Flu Vaccine (1) 08/01/2022

## 2022-12-04 DIAGNOSIS — E78.00 HYPERCHOLESTEREMIA: ICD-10-CM

## 2022-12-04 RX ORDER — PRAVASTATIN SODIUM 80 MG/1
TABLET ORAL
Qty: 90 TABLET | Refills: 0 | Status: SHIPPED | OUTPATIENT
Start: 2022-12-04

## 2022-12-05 NOTE — TELEPHONE ENCOUNTER
Please call patient and let them know I have given them one refill for now, but they need labs drawn prior to more. She should have a lab order.

## 2022-12-05 NOTE — TELEPHONE ENCOUNTER
Called and spoke with pt. She has been advised and states understanding of this and agrees with plan. Will have complete by end 2022.

## 2023-01-05 DIAGNOSIS — E78.00 HYPERCHOLESTEREMIA: Primary | ICD-10-CM

## 2023-01-05 RX ORDER — ROSUVASTATIN CALCIUM 5 MG/1
5 TABLET, COATED ORAL
Qty: 90 TABLET | Refills: 0 | Status: SHIPPED | OUTPATIENT
Start: 2023-01-05

## 2023-01-27 NOTE — PROGRESS NOTES
PT DAILY TREATMENT NOTE - [facility] 2-15    Patient Name: Nilson Garsia  Date:2019  : 1955  [x]  Patient  Verified  Payor: BLUE CROSS / Plan: Peter Lozano [id number] PPO / Product Type: PPO /    In time: 10:30a  Out time:11:40a  Total Treatment Time (min): 70  Total Timed Codes (min): 55  1:1 Treatment Time ( only): 30   Visit #:  12    Treatment Area: Bicipital tendinitis, right shoulder [M75.21]  Right shoulder pain [M25.511]    SUBJECTIVE  Pain Level (0-10 scale): 1  Any medication changes, allergies to medications, adverse drug reactions, diagnosis change, or new procedure performed?: [x] No    [] Yes (see summary sheet for update)  Subjective functional status/changes:   [x] No changes reported  Patient reports she is a little more sore after her trip from lifting and packing.      OBJECTIVE    Modality rationale: decrease pain and increase tissue extensibility to improve the patients ability to lift, carry, reach and complete ADL's   Min Type Additional Details      15 [x] Estim: []Att   [x]Unatt    []TENS instruct                  [x]IFC  []Premod   []NMES                     []Other:  []w/US   []w/ice:shoulder   [x]w/heat:neck  Position: reclined  Location:neck       []  Traction: [] Cervical       []Lumbar                       [] Prone          []Supine                       []Intermittent   []Continuous Lbs:  [] before manual  [] after manual  []w/heat    []  Ultrasound: []Continuous   [] Pulsed                       at: []1MHz   []3MHz Location:  W/cm2:    [] Paraffin         Location:   []w/heat    []  Ice     []  Heat  []  Ice massage Position:  Location:    []  Laser  []  Other: Position:  Location:      []  Vasopneumatic Device Pressure:       [] lo [] med [] hi   Temperature:      [x] Skin assessment post-treatment:  [x]intact []redness- no adverse reaction    []redness - adverse reaction:     45 min Therapeutic Exercise:  [x] See flow sheet :   Rationale: increase ROM, increase strength, improve coordination, improve balance and increase proprioception to improve the patients ability to lift, carry, reach and complete ADL's    10 min Manual Therapy: MFR B UT, Levator, deltoids, PROM all directions to tolerance, mobs with movement abduction and flexion    Rationale: decrease pain, increase ROM, increase tissue extensibility and decrease trigger points to improve the patients ability to lift, carry, reach and complete ADL's    With   [] TE   [] TA   [] neuro   [] other: Patient Education: [x] Review HEP    [] Progressed/Changed HEP based on:   [] positioning   [] body mechanics   [] transfers   [] heat/ice application    [] other:      Other Objective/Functional Measures: foto complete  Pt able to advance several exercises with min pain present for elli lifts, max fatigue noted    Pain Level (0-10 scale) post treatment: 0    ASSESSMENT/Changes in Function:     Patient will continue to benefit from skilled PT services to modify and progress therapeutic interventions, address functional mobility deficits, address ROM deficits, address strength deficits, analyze and address soft tissue restrictions, analyze and cue movement patterns, analyze and modify body mechanics/ergonomics and assess and modify postural abnormalities to attain remaining goals. []  See Plan of Care  []  See progress note/recertification  []  See Discharge Summary         Progress towards goals / Updated goals:  Patient making good progress towards goals with improved overall AROM and is able to perform more chores at home without pain. Patient to continue for 1x week for 3-4 weeks to reach remaining goals. Short Term Goals:  To be accomplished in 8 treatments:  Pt will be able to reach overhead with 0/10 pain in order to improve ability to brush hair Met  Pt will be able to reach behind back with 0/10 pain to improve ability to put on belt Progressing  Pt will be able to lift 5lb weight to shoulder level to improve ability to perform yard work Met  Long Term Goals: To be accomplished in 16 treatments:  Pt will report being able to garden for 1 hour with 0/10 pain in shoulder Met  Pt will be able to lift 10lbs to shoulder level to improve ability to put a gallon of milk on the shelf  Pt will report being able to lift a 20lb object from the ground to waist level to improve ability to carry groceries   Frequency / Duration: Patient to be seen 2 times per week for 8 weeks.         PLAN  [x]  Upgrade activities as tolerated     [x]  Continue plan of care  []  Update interventions per flow sheet       []  Discharge due to:_  []  Other:[place] 7/30/2019 arm pain/injury

## 2023-02-05 DIAGNOSIS — E03.9 ACQUIRED HYPOTHYROIDISM: ICD-10-CM

## 2023-02-05 RX ORDER — LEVOTHYROXINE SODIUM 88 UG/1
TABLET ORAL
Qty: 45 TABLET | Refills: 3 | Status: SHIPPED | OUTPATIENT
Start: 2023-02-05

## 2023-02-05 RX ORDER — LEVOTHYROXINE SODIUM 75 UG/1
TABLET ORAL
Qty: 45 TABLET | Refills: 3 | Status: SHIPPED | OUTPATIENT
Start: 2023-02-05

## 2023-03-06 DIAGNOSIS — E78.00 HYPERCHOLESTEREMIA: ICD-10-CM

## 2023-03-06 RX ORDER — PRAVASTATIN SODIUM 80 MG/1
80 TABLET ORAL
Qty: 90 TABLET | Refills: 3 | Status: SHIPPED | OUTPATIENT
Start: 2023-03-06

## 2023-05-09 LAB
ALBUMIN SERPL-MCNC: 4.1 G/DL (ref 3.5–5)
ALBUMIN/GLOB SERPL: 1.2 (ref 1.1–2.2)
ALP SERPL-CCNC: 65 U/L (ref 45–117)
ALT SERPL-CCNC: 28 U/L (ref 12–78)
AST SERPL-CCNC: 22 U/L (ref 15–37)
BILIRUB DIRECT SERPL-MCNC: 0.2 MG/DL (ref 0–0.2)
BILIRUB SERPL-MCNC: 1 MG/DL (ref 0.2–1)
GLOBULIN SER CALC-MCNC: 3.3 G/DL (ref 2–4)
PROT SERPL-MCNC: 7.4 G/DL (ref 6.4–8.2)

## 2023-05-10 LAB
CHOLEST SERPL-MCNC: 176 MG/DL (ref 100–199)
HDL SERPL-SCNC: 41 UMOL/L
HDLC SERPL-MCNC: 63 MG/DL
LDL SERPL QN: 21.1 NM
LDL SERPL-SCNC: 1271 NMOL/L
LDL SMALL SERPL-SCNC: 531 NMOL/L
LDLC SERPL CALC-MCNC: 93 MG/DL (ref 0–99)
LP-IR SCORE SERPL: 35
TRIGL SERPL-MCNC: 112 MG/DL (ref 0–149)

## 2023-05-16 SDOH — ECONOMIC STABILITY: FOOD INSECURITY: WITHIN THE PAST 12 MONTHS, THE FOOD YOU BOUGHT JUST DIDN'T LAST AND YOU DIDN'T HAVE MONEY TO GET MORE.: NEVER TRUE

## 2023-05-16 SDOH — ECONOMIC STABILITY: FOOD INSECURITY: WITHIN THE PAST 12 MONTHS, YOU WORRIED THAT YOUR FOOD WOULD RUN OUT BEFORE YOU GOT MONEY TO BUY MORE.: NEVER TRUE

## 2023-05-16 SDOH — ECONOMIC STABILITY: INCOME INSECURITY: HOW HARD IS IT FOR YOU TO PAY FOR THE VERY BASICS LIKE FOOD, HOUSING, MEDICAL CARE, AND HEATING?: NOT VERY HARD

## 2023-05-16 SDOH — ECONOMIC STABILITY: HOUSING INSECURITY
IN THE LAST 12 MONTHS, WAS THERE A TIME WHEN YOU DID NOT HAVE A STEADY PLACE TO SLEEP OR SLEPT IN A SHELTER (INCLUDING NOW)?: NO

## 2023-05-16 SDOH — ECONOMIC STABILITY: TRANSPORTATION INSECURITY
IN THE PAST 12 MONTHS, HAS LACK OF TRANSPORTATION KEPT YOU FROM MEETINGS, WORK, OR FROM GETTING THINGS NEEDED FOR DAILY LIVING?: NO

## 2023-05-17 ENCOUNTER — OFFICE VISIT (OUTPATIENT)
Facility: CLINIC | Age: 68
End: 2023-05-17

## 2023-05-17 VITALS
HEART RATE: 52 BPM | SYSTOLIC BLOOD PRESSURE: 125 MMHG | TEMPERATURE: 97.9 F | OXYGEN SATURATION: 98 % | WEIGHT: 202 LBS | HEIGHT: 66 IN | BODY MASS INDEX: 32.47 KG/M2 | DIASTOLIC BLOOD PRESSURE: 65 MMHG

## 2023-05-17 DIAGNOSIS — E03.9 HYPOTHYROIDISM, UNSPECIFIED TYPE: ICD-10-CM

## 2023-05-17 DIAGNOSIS — M21.621 TAILOR'S BUNION OF BOTH FEET: ICD-10-CM

## 2023-05-17 DIAGNOSIS — I10 ESSENTIAL HYPERTENSION: Primary | ICD-10-CM

## 2023-05-17 DIAGNOSIS — M21.622 TAILOR'S BUNION OF BOTH FEET: ICD-10-CM

## 2023-05-17 DIAGNOSIS — I10 ESSENTIAL HYPERTENSION: ICD-10-CM

## 2023-05-17 DIAGNOSIS — E78.00 HYPERCHOLESTEREMIA: ICD-10-CM

## 2023-05-17 RX ORDER — HYDROCHLOROTHIAZIDE 25 MG/1
25 TABLET ORAL DAILY
Qty: 90 TABLET | Refills: 4 | Status: SHIPPED | OUTPATIENT
Start: 2023-05-17

## 2023-05-17 RX ORDER — RAMIPRIL 2.5 MG/1
2.5 CAPSULE ORAL DAILY
Qty: 90 CAPSULE | Refills: 4 | Status: SHIPPED | OUTPATIENT
Start: 2023-05-17

## 2023-05-17 ASSESSMENT — PATIENT HEALTH QUESTIONNAIRE - PHQ9
SUM OF ALL RESPONSES TO PHQ QUESTIONS 1-9: 0
2. FEELING DOWN, DEPRESSED OR HOPELESS: 0
SUM OF ALL RESPONSES TO PHQ9 QUESTIONS 1 & 2: 0
SUM OF ALL RESPONSES TO PHQ QUESTIONS 1-9: 0
1. LITTLE INTEREST OR PLEASURE IN DOING THINGS: 0
SUM OF ALL RESPONSES TO PHQ QUESTIONS 1-9: 0
SUM OF ALL RESPONSES TO PHQ QUESTIONS 1-9: 0

## 2023-05-17 ASSESSMENT — ENCOUNTER SYMPTOMS: SHORTNESS OF BREATH: 0

## 2023-05-18 LAB
ANION GAP SERPL CALC-SCNC: 5 MMOL/L (ref 5–15)
BUN SERPL-MCNC: 11 MG/DL (ref 6–20)
BUN/CREAT SERPL: 12 (ref 12–20)
CALCIUM SERPL-MCNC: 9.7 MG/DL (ref 8.5–10.1)
CHLORIDE SERPL-SCNC: 104 MMOL/L (ref 97–108)
CO2 SERPL-SCNC: 31 MMOL/L (ref 21–32)
CREAT SERPL-MCNC: 0.93 MG/DL (ref 0.55–1.02)
GLUCOSE SERPL-MCNC: 114 MG/DL (ref 65–100)
POTASSIUM SERPL-SCNC: 4 MMOL/L (ref 3.5–5.1)
SODIUM SERPL-SCNC: 140 MMOL/L (ref 136–145)
TSH SERPL DL<=0.05 MIU/L-ACNC: 1.52 UIU/ML (ref 0.36–3.74)

## 2023-09-29 ENCOUNTER — OFFICE VISIT (OUTPATIENT)
Age: 68
End: 2023-09-29
Payer: MEDICARE

## 2023-09-29 VITALS
BODY MASS INDEX: 32.78 KG/M2 | WEIGHT: 204 LBS | SYSTOLIC BLOOD PRESSURE: 138 MMHG | HEIGHT: 66 IN | OXYGEN SATURATION: 100 % | DIASTOLIC BLOOD PRESSURE: 80 MMHG | HEART RATE: 69 BPM

## 2023-09-29 DIAGNOSIS — I10 ESSENTIAL (PRIMARY) HYPERTENSION: Primary | ICD-10-CM

## 2023-09-29 DIAGNOSIS — E78.00 HYPERCHOLESTEREMIA: ICD-10-CM

## 2023-09-29 DIAGNOSIS — R93.1 HIGH CORONARY ARTERY CALCIUM SCORE: ICD-10-CM

## 2023-09-29 PROCEDURE — 3017F COLORECTAL CA SCREEN DOC REV: CPT | Performed by: INTERNAL MEDICINE

## 2023-09-29 PROCEDURE — 1123F ACP DISCUSS/DSCN MKR DOCD: CPT | Performed by: INTERNAL MEDICINE

## 2023-09-29 PROCEDURE — 3075F SYST BP GE 130 - 139MM HG: CPT | Performed by: INTERNAL MEDICINE

## 2023-09-29 PROCEDURE — 3079F DIAST BP 80-89 MM HG: CPT | Performed by: INTERNAL MEDICINE

## 2023-09-29 PROCEDURE — 1090F PRES/ABSN URINE INCON ASSESS: CPT | Performed by: INTERNAL MEDICINE

## 2023-09-29 PROCEDURE — G8399 PT W/DXA RESULTS DOCUMENT: HCPCS | Performed by: INTERNAL MEDICINE

## 2023-09-29 PROCEDURE — 99214 OFFICE O/P EST MOD 30 MIN: CPT | Performed by: INTERNAL MEDICINE

## 2023-09-29 PROCEDURE — 1036F TOBACCO NON-USER: CPT | Performed by: INTERNAL MEDICINE

## 2023-09-29 PROCEDURE — G8427 DOCREV CUR MEDS BY ELIG CLIN: HCPCS | Performed by: INTERNAL MEDICINE

## 2023-09-29 PROCEDURE — G8417 CALC BMI ABV UP PARAM F/U: HCPCS | Performed by: INTERNAL MEDICINE

## 2023-09-29 RX ORDER — FAMOTIDINE 40 MG/1
TABLET, FILM COATED ORAL
COMMUNITY
Start: 2023-09-27

## 2023-09-29 NOTE — PROGRESS NOTES
Chief Complaint   Patient presents with    Follow-up     Annual     Hypertension    Hyperlipidemia     Vitals:    09/29/23 0920   BP: 138/80   Site: Left Upper Arm   Position: Sitting   Pulse: 69   SpO2: 100%   Weight: 204 lb (92.5 kg)   Height: 5' 6\" (1.676 m)     Feels tired all the time, stiffness around the neck.      Chest pain - sometimes     SOB denied     Palpitations - sometimes     Swelling in hands/feet denied     Dizziness denied     Recent hospital stays denied     Refills denied

## 2023-09-29 NOTE — PROGRESS NOTES
Received VO from Dr. Nikita Rivas:    CAC score in 2024 February. letter given to the pt  See Dr. Freddy Vuong in 1 yr.

## 2023-09-29 NOTE — PROGRESS NOTES
Office Follow-up    NAME: Betsy Hou   :  1955  MRM:  [id number]    Date:  2022            Assessment:     Problem List  Date Reviewed: 2022            Codes Class Noted    Osteopenia of multiple sites ICD-10-CM: M85.89  ICD-9-CM: 733.90  2021        High coronary artery calcium score ICD-10-CM: R93.1  ICD-9-CM: 414.00  2019        Gastropathy ICD-10-CM: K31.9  ICD-9-CM: 537.9  Unknown        Hypothyroidism ICD-10-CM: E03.9  ICD-9-CM: 244.9  Unknown        Hypercholesteremia ICD-10-CM: E78.00  ICD-9-CM: 272.0  Unknown        Lumbar disc disease ICD-10-CM: M51.9  ICD-9-CM: 722.93  Unknown        Cavernous hemangioma of brain ([address]) ICD-10-CM: D18.02  ICD-9-CM: 228.02  Unknown        Pulmonary nodule ICD-10-CM: R91.1  ICD-9-CM: 793.11  Unknown    Overview Signed 3/8/2018 10:42 AM by Dante Feng MD     stable, last CT around              History of seizure ICD-10-CM: Z87.898  ICD-9-CM: V13.89  Unknown    Overview Signed 3/8/2018 10:48 AM by Dante Feng MD     once, due to acrylamide exposure             Essential hypertension ICD-10-CM: I10  ICD-9-CM: 401.9  3/8/2018        Allergic rhinitis due to dust mite ICD-10-CM: J30.89  ICD-9-CM: 477.8  3/8/2018              Plan:     Positive coronary artery calcium score: Stopped Zetia due to GI side effects. Able tolerate Pravachol 80mg po daily. NMR and FLP from May 2023 reviewed. LDL is 93. CAD by CAC (): CAC of 93. Continue ASA, Pravachol. Check CAC in   Hypertension: Blood pressure is controlled. Continue Ramipril and HCTZ. Monitor salt intake. Increase physical activity. Dyslipidemia: As above. Diet and Pravachol. Cannot tolerate Zetia or Lipitor. Bradycardia: Continue to monitor. See Dr. Christina Kim in 1 year. Subjective:     Betsy Hou, a 77y.o. year-old who presents for followup. She is returning today for follow-up.   She had a coronary calcium score which was at 93 with majority

## 2024-02-07 ENCOUNTER — HOSPITAL ENCOUNTER (OUTPATIENT)
Facility: HOSPITAL | Age: 69
Discharge: HOME | End: 2024-02-10
Attending: INTERNAL MEDICINE

## 2024-02-07 DIAGNOSIS — R93.1 HIGH CORONARY ARTERY CALCIUM SCORE: ICD-10-CM

## 2024-02-07 PROCEDURE — 75571 CT HRT W/O DYE W/CA TEST: CPT

## 2024-02-07 NOTE — RESULT ENCOUNTER NOTE
Pls notify>> your CAC score is mildly elevated. The CAC score is 220. This increased from 93 in 2019.   This means you have mild plaque buildup in your arteries of the heart. Usually this means no significant blockage and can be managed by with lifestyle changes and medications. Eat healthy diet that includes fresh vegetables, fruits, nuts, low carb foods, free of GMO and gluten. Reduce saturated fat, sugars, soda's. Aerobic exercise daily. Maintain a normal weight, stop smoking tobacco if you are currently a smoker.     I would like to change Pravachol to Crestor. Pls discuss with patient if he is willing. Also add Zetia 10mg po daily.

## 2024-02-12 ENCOUNTER — TELEPHONE (OUTPATIENT)
Age: 69
End: 2024-02-12

## 2024-02-12 NOTE — TELEPHONE ENCOUNTER
Spoke with the pt,, identified the pt with name and .  Pt DOES NOT  want to change to Crestor d/t past issues with Lipitor.  Nor does she want to add Zetia, d/t has been on zetia before it made her sick to her stomach. Lack of good results on Zetia.    She recently was Dx with pre-diabetes. She stated she is making lifestyle changes and wants to stick with pravastatin. I advised her I will let Dr. Aragon know.

## 2024-02-12 NOTE — TELEPHONE ENCOUNTER
Received VO from Dr. Shaw Aragon:    change Pravachol to Crestor. Pls discuss with patient if she is willing. Also add Zetia 10mg po daily.       Unable to reach pt. Message left with HIPPA compliant message and call back number

## (undated) DEVICE — CANN NASAL O2 CAPNOGRAPHY AD -- FILTERLINE

## (undated) DEVICE — BAG BELONG PT PERS CLEAR HANDL

## (undated) DEVICE — BITEBLOCK ENDOSCP 60FR MAXI WHT POLYETH STURDY W/ VELC WVN

## (undated) DEVICE — CATH IV AUTOGRD BC BLU 22GA 25 -- INSYTE

## (undated) DEVICE — SYR 3ML LL TIP 1/10ML GRAD --

## (undated) DEVICE — SYR 5ML 1/5 GRAD LL NSAF LF --

## (undated) DEVICE — Device

## (undated) DEVICE — 1200 GUARD II KIT W/5MM TUBE W/O VAC TUBE: Brand: GUARDIAN

## (undated) DEVICE — NDL PRT INJ NSAF BLNT 18GX1.5 --

## (undated) DEVICE — BAG SPEC BIOHZRD 10 X 10 IN --

## (undated) DEVICE — BASIN EMSIS 16OZ GRAPHITE PLAS KID SHP MOLD GRAD FOR ORAL

## (undated) DEVICE — CANNULA CUSH AD W/ 14FT TBG

## (undated) DEVICE — ELECTRODE,RADIOTRANSLUCENT,FOAM,3PK: Brand: MEDLINE

## (undated) DEVICE — FORCEPS BX L240CM JAW DIA2.8MM L CAP W/ NDL MIC MESH TOOTH

## (undated) DEVICE — KIT COLON W/ 1.1OZ LUB AND 2 END

## (undated) DEVICE — ADULT SPO2 SENSOR: Brand: NELLCOR

## (undated) DEVICE — NDL FLTR TIP 5 MIC 18GX1.5IN --

## (undated) DEVICE — SET ADMIN 16ML TBNG L100IN 2 Y INJ SITE IV PIGGY BK DISP

## (undated) DEVICE — SOLIDIFIER MEDC 1200ML -- CONVERT TO 356117

## (undated) DEVICE — CONTAINER SPEC 20 ML LID NEUT BUFF FORMALIN 10 % POLYPR STS